# Patient Record
Sex: MALE | Race: WHITE | NOT HISPANIC OR LATINO | URBAN - METROPOLITAN AREA
[De-identification: names, ages, dates, MRNs, and addresses within clinical notes are randomized per-mention and may not be internally consistent; named-entity substitution may affect disease eponyms.]

---

## 2019-04-19 ENCOUNTER — OUTPATIENT (OUTPATIENT)
Dept: OUTPATIENT SERVICES | Facility: HOSPITAL | Age: 69
LOS: 1 days | Discharge: HOME | End: 2019-04-19
Payer: COMMERCIAL

## 2019-04-19 DIAGNOSIS — R06.2 WHEEZING: ICD-10-CM

## 2019-04-19 PROCEDURE — 78452 HT MUSCLE IMAGE SPECT MULT: CPT | Mod: 26

## 2020-01-01 ENCOUNTER — OUTPATIENT (OUTPATIENT)
Dept: OUTPATIENT SERVICES | Facility: HOSPITAL | Age: 70
LOS: 1 days | Discharge: HOME | End: 2020-01-01
Payer: MEDICARE

## 2020-01-01 ENCOUNTER — APPOINTMENT (OUTPATIENT)
Dept: NEUROLOGY | Facility: CLINIC | Age: 70
End: 2020-01-01

## 2020-01-01 ENCOUNTER — TRANSCRIPTION ENCOUNTER (OUTPATIENT)
Age: 70
End: 2020-01-01

## 2020-01-01 ENCOUNTER — INPATIENT (INPATIENT)
Facility: HOSPITAL | Age: 70
LOS: 5 days | Discharge: HOME | End: 2020-03-23
Attending: INTERNAL MEDICINE | Admitting: INTERNAL MEDICINE
Payer: MEDICARE

## 2020-01-01 ENCOUNTER — APPOINTMENT (OUTPATIENT)
Dept: HEMATOLOGY ONCOLOGY | Facility: CLINIC | Age: 70
End: 2020-01-01

## 2020-01-01 VITALS
OXYGEN SATURATION: 98 % | RESPIRATION RATE: 20 BRPM | HEART RATE: 89 BPM | SYSTOLIC BLOOD PRESSURE: 153 MMHG | DIASTOLIC BLOOD PRESSURE: 90 MMHG | TEMPERATURE: 98 F

## 2020-01-01 VITALS
TEMPERATURE: 98 F | SYSTOLIC BLOOD PRESSURE: 125 MMHG | RESPIRATION RATE: 20 BRPM | DIASTOLIC BLOOD PRESSURE: 79 MMHG | HEART RATE: 87 BPM

## 2020-01-01 DIAGNOSIS — G93.6 CEREBRAL EDEMA: ICD-10-CM

## 2020-01-01 DIAGNOSIS — C71.8 MALIGNANT NEOPLASM OF OVERLAPPING SITES OF BRAIN: ICD-10-CM

## 2020-01-01 DIAGNOSIS — E66.9 OBESITY, UNSPECIFIED: ICD-10-CM

## 2020-01-01 DIAGNOSIS — E78.5 HYPERLIPIDEMIA, UNSPECIFIED: ICD-10-CM

## 2020-01-01 DIAGNOSIS — G93.9 DISORDER OF BRAIN, UNSPECIFIED: ICD-10-CM

## 2020-01-01 DIAGNOSIS — I10 ESSENTIAL (PRIMARY) HYPERTENSION: ICD-10-CM

## 2020-01-01 LAB
ALBUMIN SERPL ELPH-MCNC: 3.8 G/DL — SIGNIFICANT CHANGE UP (ref 3.5–5.2)
ALBUMIN SERPL ELPH-MCNC: 4.2 G/DL — SIGNIFICANT CHANGE UP (ref 3.5–5.2)
ALBUMIN SERPL ELPH-MCNC: 4.3 G/DL — SIGNIFICANT CHANGE UP (ref 3.5–5.2)
ALP SERPL-CCNC: 78 U/L — SIGNIFICANT CHANGE UP (ref 30–115)
ALP SERPL-CCNC: 79 U/L — SIGNIFICANT CHANGE UP (ref 30–115)
ALP SERPL-CCNC: 81 U/L — SIGNIFICANT CHANGE UP (ref 30–115)
ALP SERPL-CCNC: 88 U/L — SIGNIFICANT CHANGE UP (ref 30–115)
ALP SERPL-CCNC: 93 U/L — SIGNIFICANT CHANGE UP (ref 30–115)
ALP SERPL-CCNC: 95 U/L — SIGNIFICANT CHANGE UP (ref 30–115)
ALT FLD-CCNC: 15 U/L — SIGNIFICANT CHANGE UP (ref 0–41)
ALT FLD-CCNC: 17 U/L — SIGNIFICANT CHANGE UP (ref 0–41)
ALT FLD-CCNC: 18 U/L — SIGNIFICANT CHANGE UP (ref 0–41)
ALT FLD-CCNC: 25 U/L — SIGNIFICANT CHANGE UP (ref 0–41)
ALT FLD-CCNC: 29 U/L — SIGNIFICANT CHANGE UP (ref 0–41)
ALT FLD-CCNC: 29 U/L — SIGNIFICANT CHANGE UP (ref 0–41)
ANION GAP SERPL CALC-SCNC: 11 MMOL/L — SIGNIFICANT CHANGE UP (ref 7–14)
ANION GAP SERPL CALC-SCNC: 11 MMOL/L — SIGNIFICANT CHANGE UP (ref 7–14)
ANION GAP SERPL CALC-SCNC: 12 MMOL/L — SIGNIFICANT CHANGE UP (ref 7–14)
ANION GAP SERPL CALC-SCNC: 13 MMOL/L — SIGNIFICANT CHANGE UP (ref 7–14)
ANION GAP SERPL CALC-SCNC: 9 MMOL/L — SIGNIFICANT CHANGE UP (ref 7–14)
APTT BLD: 28 SEC — SIGNIFICANT CHANGE UP (ref 27–39.2)
AST SERPL-CCNC: 14 U/L — SIGNIFICANT CHANGE UP (ref 0–41)
AST SERPL-CCNC: 14 U/L — SIGNIFICANT CHANGE UP (ref 0–41)
AST SERPL-CCNC: 15 U/L — SIGNIFICANT CHANGE UP (ref 0–41)
AST SERPL-CCNC: 15 U/L — SIGNIFICANT CHANGE UP (ref 0–41)
AST SERPL-CCNC: 18 U/L — SIGNIFICANT CHANGE UP (ref 0–41)
AST SERPL-CCNC: 24 U/L — SIGNIFICANT CHANGE UP (ref 0–41)
BASOPHILS # BLD AUTO: 0.01 K/UL — SIGNIFICANT CHANGE UP (ref 0–0.2)
BASOPHILS # BLD AUTO: 0.02 K/UL — SIGNIFICANT CHANGE UP (ref 0–0.2)
BASOPHILS # BLD AUTO: 0.03 K/UL — SIGNIFICANT CHANGE UP (ref 0–0.2)
BASOPHILS # BLD AUTO: 0.04 K/UL — SIGNIFICANT CHANGE UP (ref 0–0.2)
BASOPHILS # BLD AUTO: 0.05 K/UL — SIGNIFICANT CHANGE UP (ref 0–0.2)
BASOPHILS NFR BLD AUTO: 0.1 % — SIGNIFICANT CHANGE UP (ref 0–1)
BASOPHILS NFR BLD AUTO: 0.2 % — SIGNIFICANT CHANGE UP (ref 0–1)
BASOPHILS NFR BLD AUTO: 0.3 % — SIGNIFICANT CHANGE UP (ref 0–1)
BASOPHILS NFR BLD AUTO: 0.4 % — SIGNIFICANT CHANGE UP (ref 0–1)
BASOPHILS NFR BLD AUTO: 0.5 % — SIGNIFICANT CHANGE UP (ref 0–1)
BILIRUB SERPL-MCNC: 0.4 MG/DL — SIGNIFICANT CHANGE UP (ref 0.2–1.2)
BLD GP AB SCN SERPL QL: SIGNIFICANT CHANGE UP
BLD GP AB SCN SERPL QL: SIGNIFICANT CHANGE UP
BUN SERPL-MCNC: 19 MG/DL — SIGNIFICANT CHANGE UP (ref 10–20)
BUN SERPL-MCNC: 19 MG/DL — SIGNIFICANT CHANGE UP (ref 10–20)
BUN SERPL-MCNC: 26 MG/DL — HIGH (ref 10–20)
BUN SERPL-MCNC: 27 MG/DL — HIGH (ref 10–20)
BUN SERPL-MCNC: 29 MG/DL — HIGH (ref 10–20)
BUN SERPL-MCNC: 31 MG/DL — HIGH (ref 10–20)
BUN SERPL-MCNC: 31 MG/DL — HIGH (ref 10–20)
CALCIUM SERPL-MCNC: 8.9 MG/DL — SIGNIFICANT CHANGE UP (ref 8.5–10.1)
CALCIUM SERPL-MCNC: 9 MG/DL — SIGNIFICANT CHANGE UP (ref 8.5–10.1)
CALCIUM SERPL-MCNC: 9 MG/DL — SIGNIFICANT CHANGE UP (ref 8.5–10.1)
CALCIUM SERPL-MCNC: 9.1 MG/DL — SIGNIFICANT CHANGE UP (ref 8.5–10.1)
CALCIUM SERPL-MCNC: 9.2 MG/DL — SIGNIFICANT CHANGE UP (ref 8.5–10.1)
CALCIUM SERPL-MCNC: 9.4 MG/DL — SIGNIFICANT CHANGE UP (ref 8.5–10.1)
CALCIUM SERPL-MCNC: 9.4 MG/DL — SIGNIFICANT CHANGE UP (ref 8.5–10.1)
CANCER AG19-9 SERPL-ACNC: 11 U/ML — SIGNIFICANT CHANGE UP
CEA SERPL-MCNC: 1.1 NG/ML — SIGNIFICANT CHANGE UP (ref 0–3.8)
CHLORIDE SERPL-SCNC: 101 MMOL/L — SIGNIFICANT CHANGE UP (ref 98–110)
CHLORIDE SERPL-SCNC: 102 MMOL/L — SIGNIFICANT CHANGE UP (ref 98–110)
CHLORIDE SERPL-SCNC: 105 MMOL/L — SIGNIFICANT CHANGE UP (ref 98–110)
CHLORIDE SERPL-SCNC: 98 MMOL/L — SIGNIFICANT CHANGE UP (ref 98–110)
CHLORIDE SERPL-SCNC: 98 MMOL/L — SIGNIFICANT CHANGE UP (ref 98–110)
CO2 SERPL-SCNC: 25 MMOL/L — SIGNIFICANT CHANGE UP (ref 17–32)
CO2 SERPL-SCNC: 26 MMOL/L — SIGNIFICANT CHANGE UP (ref 17–32)
CO2 SERPL-SCNC: 27 MMOL/L — SIGNIFICANT CHANGE UP (ref 17–32)
CO2 SERPL-SCNC: 27 MMOL/L — SIGNIFICANT CHANGE UP (ref 17–32)
CREAT SERPL-MCNC: 0.8 MG/DL — SIGNIFICANT CHANGE UP (ref 0.7–1.5)
CREAT SERPL-MCNC: 0.8 MG/DL — SIGNIFICANT CHANGE UP (ref 0.7–1.5)
CREAT SERPL-MCNC: 0.9 MG/DL — SIGNIFICANT CHANGE UP (ref 0.7–1.5)
CREAT SERPL-MCNC: 1 MG/DL — SIGNIFICANT CHANGE UP (ref 0.7–1.5)
EOSINOPHIL # BLD AUTO: 0 K/UL — SIGNIFICANT CHANGE UP (ref 0–0.7)
EOSINOPHIL # BLD AUTO: 0.04 K/UL — SIGNIFICANT CHANGE UP (ref 0–0.7)
EOSINOPHIL # BLD AUTO: 0.18 K/UL — SIGNIFICANT CHANGE UP (ref 0–0.7)
EOSINOPHIL NFR BLD AUTO: 0 % — SIGNIFICANT CHANGE UP (ref 0–8)
EOSINOPHIL NFR BLD AUTO: 0.4 % — SIGNIFICANT CHANGE UP (ref 0–8)
EOSINOPHIL NFR BLD AUTO: 2.3 % — SIGNIFICANT CHANGE UP (ref 0–8)
GLUCOSE SERPL-MCNC: 118 MG/DL — HIGH (ref 70–99)
GLUCOSE SERPL-MCNC: 133 MG/DL — HIGH (ref 70–99)
GLUCOSE SERPL-MCNC: 134 MG/DL — HIGH (ref 70–99)
GLUCOSE SERPL-MCNC: 135 MG/DL — HIGH (ref 70–99)
GLUCOSE SERPL-MCNC: 136 MG/DL — HIGH (ref 70–99)
GLUCOSE SERPL-MCNC: 140 MG/DL — HIGH (ref 70–99)
GLUCOSE SERPL-MCNC: 99 MG/DL — SIGNIFICANT CHANGE UP (ref 70–99)
HCT VFR BLD CALC: 42.3 % — SIGNIFICANT CHANGE UP (ref 42–52)
HCT VFR BLD CALC: 43.1 % — SIGNIFICANT CHANGE UP (ref 42–52)
HCT VFR BLD CALC: 43.3 % — SIGNIFICANT CHANGE UP (ref 42–52)
HCT VFR BLD CALC: 43.6 % — SIGNIFICANT CHANGE UP (ref 42–52)
HCT VFR BLD CALC: 44 % — SIGNIFICANT CHANGE UP (ref 42–52)
HCT VFR BLD CALC: 44.1 % — SIGNIFICANT CHANGE UP (ref 42–52)
HCT VFR BLD CALC: 44.4 % — SIGNIFICANT CHANGE UP (ref 42–52)
HCV AB S/CO SERPL IA: 0.09 S/CO — SIGNIFICANT CHANGE UP (ref 0–0.99)
HCV AB SERPL-IMP: SIGNIFICANT CHANGE UP
HGB BLD-MCNC: 14 G/DL — SIGNIFICANT CHANGE UP (ref 14–18)
HGB BLD-MCNC: 14.2 G/DL — SIGNIFICANT CHANGE UP (ref 14–18)
HGB BLD-MCNC: 14.2 G/DL — SIGNIFICANT CHANGE UP (ref 14–18)
HGB BLD-MCNC: 14.3 G/DL — SIGNIFICANT CHANGE UP (ref 14–18)
HGB BLD-MCNC: 14.4 G/DL — SIGNIFICANT CHANGE UP (ref 14–18)
HGB BLD-MCNC: 14.6 G/DL — SIGNIFICANT CHANGE UP (ref 14–18)
HGB BLD-MCNC: 14.8 G/DL — SIGNIFICANT CHANGE UP (ref 14–18)
IMM GRANULOCYTES NFR BLD AUTO: 0.4 % — HIGH (ref 0.1–0.3)
IMM GRANULOCYTES NFR BLD AUTO: 0.5 % — HIGH (ref 0.1–0.3)
IMM GRANULOCYTES NFR BLD AUTO: 0.5 % — HIGH (ref 0.1–0.3)
IMM GRANULOCYTES NFR BLD AUTO: 0.6 % — HIGH (ref 0.1–0.3)
IMM GRANULOCYTES NFR BLD AUTO: 0.9 % — HIGH (ref 0.1–0.3)
IMM GRANULOCYTES NFR BLD AUTO: 1.3 % — HIGH (ref 0.1–0.3)
IMM GRANULOCYTES NFR BLD AUTO: 2.4 % — HIGH (ref 0.1–0.3)
INR BLD: 1.12 RATIO — SIGNIFICANT CHANGE UP (ref 0.65–1.3)
LYMPHOCYTES # BLD AUTO: 0.7 K/UL — LOW (ref 1.2–3.4)
LYMPHOCYTES # BLD AUTO: 0.99 K/UL — LOW (ref 1.2–3.4)
LYMPHOCYTES # BLD AUTO: 1.12 K/UL — LOW (ref 1.2–3.4)
LYMPHOCYTES # BLD AUTO: 1.2 K/UL — SIGNIFICANT CHANGE UP (ref 1.2–3.4)
LYMPHOCYTES # BLD AUTO: 1.22 K/UL — SIGNIFICANT CHANGE UP (ref 1.2–3.4)
LYMPHOCYTES # BLD AUTO: 1.36 K/UL — SIGNIFICANT CHANGE UP (ref 1.2–3.4)
LYMPHOCYTES # BLD AUTO: 1.44 K/UL — SIGNIFICANT CHANGE UP (ref 1.2–3.4)
LYMPHOCYTES # BLD AUTO: 10.8 % — LOW (ref 20.5–51.1)
LYMPHOCYTES # BLD AUTO: 10.9 % — LOW (ref 20.5–51.1)
LYMPHOCYTES # BLD AUTO: 11.2 % — LOW (ref 20.5–51.1)
LYMPHOCYTES # BLD AUTO: 11.9 % — LOW (ref 20.5–51.1)
LYMPHOCYTES # BLD AUTO: 12.4 % — LOW (ref 20.5–51.1)
LYMPHOCYTES # BLD AUTO: 15.7 % — LOW (ref 20.5–51.1)
LYMPHOCYTES # BLD AUTO: 9.9 % — LOW (ref 20.5–51.1)
MAGNESIUM SERPL-MCNC: 2 MG/DL — SIGNIFICANT CHANGE UP (ref 1.8–2.4)
MAGNESIUM SERPL-MCNC: 2.1 MG/DL — SIGNIFICANT CHANGE UP (ref 1.8–2.4)
MCHC RBC-ENTMCNC: 27.5 PG — SIGNIFICANT CHANGE UP (ref 27–31)
MCHC RBC-ENTMCNC: 27.6 PG — SIGNIFICANT CHANGE UP (ref 27–31)
MCHC RBC-ENTMCNC: 27.6 PG — SIGNIFICANT CHANGE UP (ref 27–31)
MCHC RBC-ENTMCNC: 28.4 PG — SIGNIFICANT CHANGE UP (ref 27–31)
MCHC RBC-ENTMCNC: 28.7 PG — SIGNIFICANT CHANGE UP (ref 27–31)
MCHC RBC-ENTMCNC: 28.7 PG — SIGNIFICANT CHANGE UP (ref 27–31)
MCHC RBC-ENTMCNC: 29.2 PG — SIGNIFICANT CHANGE UP (ref 27–31)
MCHC RBC-ENTMCNC: 32.2 G/DL — SIGNIFICANT CHANGE UP (ref 32–37)
MCHC RBC-ENTMCNC: 32.3 G/DL — SIGNIFICANT CHANGE UP (ref 32–37)
MCHC RBC-ENTMCNC: 32.4 G/DL — SIGNIFICANT CHANGE UP (ref 32–37)
MCHC RBC-ENTMCNC: 33.1 G/DL — SIGNIFICANT CHANGE UP (ref 32–37)
MCHC RBC-ENTMCNC: 33.2 G/DL — SIGNIFICANT CHANGE UP (ref 32–37)
MCHC RBC-ENTMCNC: 33.7 G/DL — SIGNIFICANT CHANGE UP (ref 32–37)
MCHC RBC-ENTMCNC: 33.9 G/DL — SIGNIFICANT CHANGE UP (ref 32–37)
MCV RBC AUTO: 84.7 FL — SIGNIFICANT CHANGE UP (ref 80–94)
MCV RBC AUTO: 85.1 FL — SIGNIFICANT CHANGE UP (ref 80–94)
MCV RBC AUTO: 85.1 FL — SIGNIFICANT CHANGE UP (ref 80–94)
MCV RBC AUTO: 85.5 FL — SIGNIFICANT CHANGE UP (ref 80–94)
MCV RBC AUTO: 85.6 FL — SIGNIFICANT CHANGE UP (ref 80–94)
MCV RBC AUTO: 86.6 FL — SIGNIFICANT CHANGE UP (ref 80–94)
MCV RBC AUTO: 86.7 FL — SIGNIFICANT CHANGE UP (ref 80–94)
MONOCYTES # BLD AUTO: 0.15 K/UL — SIGNIFICANT CHANGE UP (ref 0.1–0.6)
MONOCYTES # BLD AUTO: 0.42 K/UL — SIGNIFICANT CHANGE UP (ref 0.1–0.6)
MONOCYTES # BLD AUTO: 0.55 K/UL — SIGNIFICANT CHANGE UP (ref 0.1–0.6)
MONOCYTES # BLD AUTO: 0.58 K/UL — SIGNIFICANT CHANGE UP (ref 0.1–0.6)
MONOCYTES # BLD AUTO: 0.61 K/UL — HIGH (ref 0.1–0.6)
MONOCYTES # BLD AUTO: 0.63 K/UL — HIGH (ref 0.1–0.6)
MONOCYTES # BLD AUTO: 0.78 K/UL — HIGH (ref 0.1–0.6)
MONOCYTES NFR BLD AUTO: 2.1 % — SIGNIFICANT CHANGE UP (ref 1.7–9.3)
MONOCYTES NFR BLD AUTO: 4.7 % — SIGNIFICANT CHANGE UP (ref 1.7–9.3)
MONOCYTES NFR BLD AUTO: 5.6 % — SIGNIFICANT CHANGE UP (ref 1.7–9.3)
MONOCYTES NFR BLD AUTO: 5.8 % — SIGNIFICANT CHANGE UP (ref 1.7–9.3)
MONOCYTES NFR BLD AUTO: 5.9 % — SIGNIFICANT CHANGE UP (ref 1.7–9.3)
MONOCYTES NFR BLD AUTO: 6 % — SIGNIFICANT CHANGE UP (ref 1.7–9.3)
MONOCYTES NFR BLD AUTO: 7.1 % — SIGNIFICANT CHANGE UP (ref 1.7–9.3)
NEUTROPHILS # BLD AUTO: 10.63 K/UL — HIGH (ref 1.4–6.5)
NEUTROPHILS # BLD AUTO: 5.76 K/UL — SIGNIFICANT CHANGE UP (ref 1.4–6.5)
NEUTROPHILS # BLD AUTO: 6.17 K/UL — SIGNIFICANT CHANGE UP (ref 1.4–6.5)
NEUTROPHILS # BLD AUTO: 7.41 K/UL — HIGH (ref 1.4–6.5)
NEUTROPHILS # BLD AUTO: 8.19 K/UL — HIGH (ref 1.4–6.5)
NEUTROPHILS # BLD AUTO: 8.56 K/UL — HIGH (ref 1.4–6.5)
NEUTROPHILS # BLD AUTO: 8.77 K/UL — HIGH (ref 1.4–6.5)
NEUTROPHILS NFR BLD AUTO: 74 % — SIGNIFICANT CHANGE UP (ref 42.2–75.2)
NEUTROPHILS NFR BLD AUTO: 80.2 % — HIGH (ref 42.2–75.2)
NEUTROPHILS NFR BLD AUTO: 80.4 % — HIGH (ref 42.2–75.2)
NEUTROPHILS NFR BLD AUTO: 81.1 % — HIGH (ref 42.2–75.2)
NEUTROPHILS NFR BLD AUTO: 82.5 % — HIGH (ref 42.2–75.2)
NEUTROPHILS NFR BLD AUTO: 83.5 % — HIGH (ref 42.2–75.2)
NEUTROPHILS NFR BLD AUTO: 87.3 % — HIGH (ref 42.2–75.2)
NRBC # BLD: 0 /100 WBCS — SIGNIFICANT CHANGE UP (ref 0–0)
PLATELET # BLD AUTO: 208 K/UL — SIGNIFICANT CHANGE UP (ref 130–400)
PLATELET # BLD AUTO: 254 K/UL — SIGNIFICANT CHANGE UP (ref 130–400)
PLATELET # BLD AUTO: 266 K/UL — SIGNIFICANT CHANGE UP (ref 130–400)
PLATELET # BLD AUTO: 273 K/UL — SIGNIFICANT CHANGE UP (ref 130–400)
PLATELET # BLD AUTO: 273 K/UL — SIGNIFICANT CHANGE UP (ref 130–400)
PLATELET # BLD AUTO: 279 K/UL — SIGNIFICANT CHANGE UP (ref 130–400)
PLATELET # BLD AUTO: 285 K/UL — SIGNIFICANT CHANGE UP (ref 130–400)
POTASSIUM SERPL-MCNC: 4.8 MMOL/L — SIGNIFICANT CHANGE UP (ref 3.5–5)
POTASSIUM SERPL-MCNC: 4.9 MMOL/L — SIGNIFICANT CHANGE UP (ref 3.5–5)
POTASSIUM SERPL-MCNC: 5 MMOL/L — SIGNIFICANT CHANGE UP (ref 3.5–5)
POTASSIUM SERPL-MCNC: 5.1 MMOL/L — HIGH (ref 3.5–5)
POTASSIUM SERPL-MCNC: 6.3 MMOL/L — CRITICAL HIGH (ref 3.5–5)
POTASSIUM SERPL-SCNC: 4.8 MMOL/L — SIGNIFICANT CHANGE UP (ref 3.5–5)
POTASSIUM SERPL-SCNC: 4.9 MMOL/L — SIGNIFICANT CHANGE UP (ref 3.5–5)
POTASSIUM SERPL-SCNC: 5 MMOL/L — SIGNIFICANT CHANGE UP (ref 3.5–5)
POTASSIUM SERPL-SCNC: 5.1 MMOL/L — HIGH (ref 3.5–5)
POTASSIUM SERPL-SCNC: 6.3 MMOL/L — CRITICAL HIGH (ref 3.5–5)
PROT SERPL-MCNC: 6.4 G/DL — SIGNIFICANT CHANGE UP (ref 6–8)
PROT SERPL-MCNC: 6.4 G/DL — SIGNIFICANT CHANGE UP (ref 6–8)
PROT SERPL-MCNC: 6.6 G/DL — SIGNIFICANT CHANGE UP (ref 6–8)
PROT SERPL-MCNC: 6.7 G/DL — SIGNIFICANT CHANGE UP (ref 6–8)
PROT SERPL-MCNC: 6.8 G/DL — SIGNIFICANT CHANGE UP (ref 6–8)
PROT SERPL-MCNC: 6.9 G/DL — SIGNIFICANT CHANGE UP (ref 6–8)
PROTHROM AB SERPL-ACNC: 12.9 SEC — HIGH (ref 9.95–12.87)
PSA FLD-MCNC: 4.01 NG/ML — HIGH (ref 0–4)
RBC # BLD: 4.88 M/UL — SIGNIFICANT CHANGE UP (ref 4.7–6.1)
RBC # BLD: 5 M/UL — SIGNIFICANT CHANGE UP (ref 4.7–6.1)
RBC # BLD: 5.04 M/UL — SIGNIFICANT CHANGE UP (ref 4.7–6.1)
RBC # BLD: 5.15 M/UL — SIGNIFICANT CHANGE UP (ref 4.7–6.1)
RBC # BLD: 5.15 M/UL — SIGNIFICANT CHANGE UP (ref 4.7–6.1)
RBC # BLD: 5.17 M/UL — SIGNIFICANT CHANGE UP (ref 4.7–6.1)
RBC # BLD: 5.22 M/UL — SIGNIFICANT CHANGE UP (ref 4.7–6.1)
RBC # FLD: 13.5 % — SIGNIFICANT CHANGE UP (ref 11.5–14.5)
RBC # FLD: 13.6 % — SIGNIFICANT CHANGE UP (ref 11.5–14.5)
RBC # FLD: 13.7 % — SIGNIFICANT CHANGE UP (ref 11.5–14.5)
RBC # FLD: 13.8 % — SIGNIFICANT CHANGE UP (ref 11.5–14.5)
SODIUM SERPL-SCNC: 135 MMOL/L — SIGNIFICANT CHANGE UP (ref 135–146)
SODIUM SERPL-SCNC: 136 MMOL/L — SIGNIFICANT CHANGE UP (ref 135–146)
SODIUM SERPL-SCNC: 137 MMOL/L — SIGNIFICANT CHANGE UP (ref 135–146)
SODIUM SERPL-SCNC: 139 MMOL/L — SIGNIFICANT CHANGE UP (ref 135–146)
SODIUM SERPL-SCNC: 139 MMOL/L — SIGNIFICANT CHANGE UP (ref 135–146)
SODIUM SERPL-SCNC: 140 MMOL/L — SIGNIFICANT CHANGE UP (ref 135–146)
SODIUM SERPL-SCNC: 142 MMOL/L — SIGNIFICANT CHANGE UP (ref 135–146)
WBC # BLD: 10.1 K/UL — SIGNIFICANT CHANGE UP (ref 4.8–10.8)
WBC # BLD: 10.37 K/UL — SIGNIFICANT CHANGE UP (ref 4.8–10.8)
WBC # BLD: 10.93 K/UL — HIGH (ref 4.8–10.8)
WBC # BLD: 13.22 K/UL — HIGH (ref 4.8–10.8)
WBC # BLD: 7.07 K/UL — SIGNIFICANT CHANGE UP (ref 4.8–10.8)
WBC # BLD: 7.78 K/UL — SIGNIFICANT CHANGE UP (ref 4.8–10.8)
WBC # BLD: 8.87 K/UL — SIGNIFICANT CHANGE UP (ref 4.8–10.8)
WBC # FLD AUTO: 10.1 K/UL — SIGNIFICANT CHANGE UP (ref 4.8–10.8)
WBC # FLD AUTO: 10.37 K/UL — SIGNIFICANT CHANGE UP (ref 4.8–10.8)
WBC # FLD AUTO: 10.93 K/UL — HIGH (ref 4.8–10.8)
WBC # FLD AUTO: 13.22 K/UL — HIGH (ref 4.8–10.8)
WBC # FLD AUTO: 7.07 K/UL — SIGNIFICANT CHANGE UP (ref 4.8–10.8)
WBC # FLD AUTO: 7.78 K/UL — SIGNIFICANT CHANGE UP (ref 4.8–10.8)
WBC # FLD AUTO: 8.87 K/UL — SIGNIFICANT CHANGE UP (ref 4.8–10.8)

## 2020-01-01 PROCEDURE — 99231 SBSQ HOSP IP/OBS SF/LOW 25: CPT

## 2020-01-01 PROCEDURE — 99223 1ST HOSP IP/OBS HIGH 75: CPT

## 2020-01-01 PROCEDURE — 99285 EMERGENCY DEPT VISIT HI MDM: CPT

## 2020-01-01 PROCEDURE — 99232 SBSQ HOSP IP/OBS MODERATE 35: CPT

## 2020-01-01 PROCEDURE — 74177 CT ABD & PELVIS W/CONTRAST: CPT | Mod: 26

## 2020-01-01 PROCEDURE — 70552 MRI BRAIN STEM W/DYE: CPT | Mod: 26

## 2020-01-01 PROCEDURE — 70450 CT HEAD/BRAIN W/O DYE: CPT | Mod: 26

## 2020-01-01 PROCEDURE — 99233 SBSQ HOSP IP/OBS HIGH 50: CPT

## 2020-01-01 PROCEDURE — 71045 X-RAY EXAM CHEST 1 VIEW: CPT | Mod: 26

## 2020-01-01 PROCEDURE — 70553 MRI BRAIN STEM W/O & W/DYE: CPT | Mod: 26

## 2020-01-01 PROCEDURE — 99252 IP/OBS CONSLTJ NEW/EST SF 35: CPT

## 2020-01-01 PROCEDURE — 71260 CT THORAX DX C+: CPT | Mod: 26

## 2020-01-01 PROCEDURE — 93010 ELECTROCARDIOGRAM REPORT: CPT

## 2020-01-01 RX ORDER — LEVETIRACETAM 250 MG/1
500 TABLET, FILM COATED ORAL
Refills: 0 | Status: DISCONTINUED | OUTPATIENT
Start: 2020-01-01 | End: 2020-01-01

## 2020-01-01 RX ORDER — ATORVASTATIN CALCIUM 80 MG/1
40 TABLET, FILM COATED ORAL AT BEDTIME
Refills: 0 | Status: DISCONTINUED | OUTPATIENT
Start: 2020-01-01 | End: 2020-01-01

## 2020-01-01 RX ORDER — DEXAMETHASONE 0.5 MG/5ML
10 ELIXIR ORAL ONCE
Refills: 0 | Status: COMPLETED | OUTPATIENT
Start: 2020-01-01 | End: 2020-01-01

## 2020-01-01 RX ORDER — DEXAMETHASONE 0.5 MG/5ML
4 ELIXIR ORAL EVERY 6 HOURS
Refills: 0 | Status: DISCONTINUED | OUTPATIENT
Start: 2020-01-01 | End: 2020-01-01

## 2020-01-01 RX ORDER — DEXAMETHASONE 0.5 MG/5ML
10 ELIXIR ORAL ONCE
Refills: 0 | Status: DISCONTINUED | OUTPATIENT
Start: 2020-01-01 | End: 2020-01-01

## 2020-01-01 RX ORDER — LISINOPRIL 2.5 MG/1
1 TABLET ORAL
Qty: 0 | Refills: 0 | DISCHARGE

## 2020-01-01 RX ORDER — ENOXAPARIN SODIUM 100 MG/ML
40 INJECTION SUBCUTANEOUS DAILY
Refills: 0 | Status: DISCONTINUED | OUTPATIENT
Start: 2020-01-01 | End: 2020-01-01

## 2020-01-01 RX ORDER — IOHEXOL 300 MG/ML
30 INJECTION, SOLUTION INTRAVENOUS ONCE
Refills: 0 | Status: COMPLETED | OUTPATIENT
Start: 2020-01-01 | End: 2020-01-01

## 2020-01-01 RX ORDER — DEXAMETHASONE 0.5 MG/5ML
1 ELIXIR ORAL
Qty: 28 | Refills: 0
Start: 2020-01-01 | End: 2020-01-01

## 2020-01-01 RX ORDER — DEXAMETHASONE 0.5 MG/5ML
1 ELIXIR ORAL
Qty: 120 | Refills: 0
Start: 2020-01-01 | End: 2020-01-01

## 2020-01-01 RX ORDER — LEVETIRACETAM 250 MG/1
500 TABLET, FILM COATED ORAL ONCE
Refills: 0 | Status: COMPLETED | OUTPATIENT
Start: 2020-01-01 | End: 2020-01-01

## 2020-01-01 RX ORDER — ASPIRIN/CALCIUM CARB/MAGNESIUM 324 MG
1 TABLET ORAL
Qty: 0 | Refills: 0 | DISCHARGE

## 2020-01-01 RX ORDER — ROSUVASTATIN CALCIUM 5 MG/1
1 TABLET ORAL
Qty: 0 | Refills: 0 | DISCHARGE

## 2020-01-01 RX ORDER — LEVETIRACETAM 250 MG/1
1 TABLET, FILM COATED ORAL
Qty: 60 | Refills: 0
Start: 2020-01-01 | End: 2020-01-01

## 2020-01-01 RX ORDER — LISINOPRIL 2.5 MG/1
2.5 TABLET ORAL DAILY
Refills: 0 | Status: DISCONTINUED | OUTPATIENT
Start: 2020-01-01 | End: 2020-01-01

## 2020-01-01 RX ADMIN — Medication 4 MILLIGRAM(S): at 11:01

## 2020-01-01 RX ADMIN — Medication 102 MILLIGRAM(S): at 14:50

## 2020-01-01 RX ADMIN — Medication 4 MILLIGRAM(S): at 17:42

## 2020-01-01 RX ADMIN — LISINOPRIL 2.5 MILLIGRAM(S): 2.5 TABLET ORAL at 05:34

## 2020-01-01 RX ADMIN — Medication 4 MILLIGRAM(S): at 12:43

## 2020-01-01 RX ADMIN — Medication 4 MILLIGRAM(S): at 23:58

## 2020-01-01 RX ADMIN — Medication 4 MILLIGRAM(S): at 00:10

## 2020-01-01 RX ADMIN — Medication 4 MILLIGRAM(S): at 00:57

## 2020-01-01 RX ADMIN — LEVETIRACETAM 500 MILLIGRAM(S): 250 TABLET, FILM COATED ORAL at 17:22

## 2020-01-01 RX ADMIN — LEVETIRACETAM 500 MILLIGRAM(S): 250 TABLET, FILM COATED ORAL at 06:07

## 2020-01-01 RX ADMIN — Medication 4 MILLIGRAM(S): at 05:34

## 2020-01-01 RX ADMIN — Medication 4 MILLIGRAM(S): at 17:23

## 2020-01-01 RX ADMIN — LEVETIRACETAM 500 MILLIGRAM(S): 250 TABLET, FILM COATED ORAL at 17:23

## 2020-01-01 RX ADMIN — LEVETIRACETAM 420 MILLIGRAM(S): 250 TABLET, FILM COATED ORAL at 17:00

## 2020-01-01 RX ADMIN — LEVETIRACETAM 500 MILLIGRAM(S): 250 TABLET, FILM COATED ORAL at 05:03

## 2020-01-01 RX ADMIN — Medication 4 MILLIGRAM(S): at 01:00

## 2020-01-01 RX ADMIN — LEVETIRACETAM 500 MILLIGRAM(S): 250 TABLET, FILM COATED ORAL at 17:15

## 2020-01-01 RX ADMIN — Medication 4 MILLIGRAM(S): at 00:00

## 2020-01-01 RX ADMIN — ATORVASTATIN CALCIUM 40 MILLIGRAM(S): 80 TABLET, FILM COATED ORAL at 22:07

## 2020-01-01 RX ADMIN — Medication 4 MILLIGRAM(S): at 05:14

## 2020-01-01 RX ADMIN — LEVETIRACETAM 500 MILLIGRAM(S): 250 TABLET, FILM COATED ORAL at 17:42

## 2020-01-01 RX ADMIN — ATORVASTATIN CALCIUM 40 MILLIGRAM(S): 80 TABLET, FILM COATED ORAL at 21:16

## 2020-01-01 RX ADMIN — Medication 4 MILLIGRAM(S): at 23:00

## 2020-01-01 RX ADMIN — ATORVASTATIN CALCIUM 40 MILLIGRAM(S): 80 TABLET, FILM COATED ORAL at 21:12

## 2020-01-01 RX ADMIN — LEVETIRACETAM 500 MILLIGRAM(S): 250 TABLET, FILM COATED ORAL at 05:14

## 2020-01-01 RX ADMIN — ENOXAPARIN SODIUM 40 MILLIGRAM(S): 100 INJECTION SUBCUTANEOUS at 11:01

## 2020-01-01 RX ADMIN — Medication 4 MILLIGRAM(S): at 05:03

## 2020-01-01 RX ADMIN — Medication 4 MILLIGRAM(S): at 17:15

## 2020-01-01 RX ADMIN — ATORVASTATIN CALCIUM 40 MILLIGRAM(S): 80 TABLET, FILM COATED ORAL at 21:39

## 2020-01-01 RX ADMIN — LISINOPRIL 2.5 MILLIGRAM(S): 2.5 TABLET ORAL at 05:20

## 2020-01-01 RX ADMIN — LEVETIRACETAM 500 MILLIGRAM(S): 250 TABLET, FILM COATED ORAL at 05:20

## 2020-01-01 RX ADMIN — LISINOPRIL 2.5 MILLIGRAM(S): 2.5 TABLET ORAL at 06:07

## 2020-01-01 RX ADMIN — LISINOPRIL 2.5 MILLIGRAM(S): 2.5 TABLET ORAL at 05:03

## 2020-01-01 RX ADMIN — Medication 4 MILLIGRAM(S): at 06:08

## 2020-01-01 RX ADMIN — Medication 10 MILLIGRAM(S): at 17:43

## 2020-01-01 RX ADMIN — Medication 4 MILLIGRAM(S): at 11:08

## 2020-01-01 RX ADMIN — ENOXAPARIN SODIUM 40 MILLIGRAM(S): 100 INJECTION SUBCUTANEOUS at 11:08

## 2020-01-01 RX ADMIN — ATORVASTATIN CALCIUM 40 MILLIGRAM(S): 80 TABLET, FILM COATED ORAL at 20:57

## 2020-01-01 RX ADMIN — LISINOPRIL 2.5 MILLIGRAM(S): 2.5 TABLET ORAL at 06:29

## 2020-01-01 RX ADMIN — LEVETIRACETAM 500 MILLIGRAM(S): 250 TABLET, FILM COATED ORAL at 05:34

## 2020-01-01 RX ADMIN — Medication 4 MILLIGRAM(S): at 17:22

## 2020-01-01 RX ADMIN — Medication 4 MILLIGRAM(S): at 12:35

## 2020-01-01 RX ADMIN — IOHEXOL 30 MILLILITER(S): 300 INJECTION, SOLUTION INTRAVENOUS at 10:22

## 2020-01-01 RX ADMIN — Medication 4 MILLIGRAM(S): at 17:04

## 2020-01-01 RX ADMIN — ATORVASTATIN CALCIUM 40 MILLIGRAM(S): 80 TABLET, FILM COATED ORAL at 21:09

## 2020-01-01 RX ADMIN — LEVETIRACETAM 500 MILLIGRAM(S): 250 TABLET, FILM COATED ORAL at 06:29

## 2020-01-01 RX ADMIN — Medication 4 MILLIGRAM(S): at 06:29

## 2020-01-01 RX ADMIN — Medication 4 MILLIGRAM(S): at 11:53

## 2020-01-01 RX ADMIN — Medication 4 MILLIGRAM(S): at 05:20

## 2020-01-01 RX ADMIN — LEVETIRACETAM 500 MILLIGRAM(S): 250 TABLET, FILM COATED ORAL at 17:04

## 2020-01-01 RX ADMIN — LISINOPRIL 2.5 MILLIGRAM(S): 2.5 TABLET ORAL at 05:14

## 2020-03-17 NOTE — ED PROVIDER NOTE - CLINICAL SUMMARY MEDICAL DECISION MAKING FREE TEXT BOX
Pt with 2-3 months of confusion, memory loss, unsteady gait, recent MRI with 3 brain masses. head ct in ER with extensive edema, no bleeding.  pt given decadron, keppra, seen by neurosurg, admitted for continued w/u.

## 2020-03-17 NOTE — H&P ADULT - ATTENDING COMMENTS
Pt was seen and examined at bedside independently, pt is upset because family is not visiting due to hospital restrictions, he is very forgetful. He denies any complaints, no pain, no HA. On admission he was found to have brain lesions with midline shift ( likely metastatic), no know primary.  Today he is scheduled for CT chest, abdomen and pelvis, pt denies any h/o smoking, no h/o screening colonoscopy in the past ( pt is not reliable historian).  I agree with medical resident's findings , assessment and plan above with few corrections in my note.     Vital Signs Last 24 Hrs  T(C): 35.9 (18 Mar 2020 04:24), Max: 37 (17 Mar 2020 19:19)  T(F): 96.7 (18 Mar 2020 04:24), Max: 98.6 (17 Mar 2020 19:19)  HR: 80 (18 Mar 2020 04:24) (80 - 98)  BP: 131/79 (18 Mar 2020 04:24) (119/46 - 149/87)  BP(mean): --  RR: 20 (18 Mar 2020 04:24) (20 - 22)  SpO2: 98% (17 Mar 2020 17:51) (98% - 98%)    GENERAL: NAD, forgetful , obese   CHEST/LUNG: Clear to auscultation bilaterally, distant BS   HEART: Regular rate and rhythm  ABDOMEN: Bowel sounds present; Soft, Nontender, Nondistended, obese   EXTREMITIES: trace edema on LE   NERVOUS SYSTEM:  Alert & Oriented X3, forgetful, no focal neuro deficit     LABS:                         14.6   7.07  )-----------( 273      ( 18 Mar 2020 06:33 )             43.3   `03-18    142  |  102  |  19  ----------------------------<  140<H>  5.0   |  27  |  0.8    Ca    9.4      18 Mar 2020 06:33  Mg     2.1     03-18    TPro  6.6  /  Alb  3.8  /  TBili  0.4  /  DBili  x   /  AST  24  /  ALT  18  /  AlkPhos  95  03-17    RADIOLOGY:  < from: CT Abdomen and Pelvis w/ Oral Cont and w/ IV Cont (03.18.20 @ 13:10) >    IMPRESSION:     1. No evidence of intra-abdominal or pelvic metastasis.    2. Scattered sigmoid colonic diverticula, without evidence of diverticulitis.  < from: Xray Chest 1 View AP/PA (03.17.20 @ 16:41) >  Impression:  Mild interstitial opacities.  Heart appears enlarged, limited in evaluation due to AP portable technique  < from: CT Head No Cont (03.17.20 @ 14:54) >    IMPRESSION:    Extensive edema in the left cerebral hemisphere, splenium of the corpus callosum with extension into the right parietal lobe, this results in mass effect upon the adjacent left lateral ventricle and third ventricle. Underlying lesions were demonstrated on the prior MRI of the brain dated March 12, 2020, please see that report for further information.    A/P  # AMS / brain metastatic Dz   - pt denies h/o smoking, no h/o screening colonoscopy in the past ( pt is a poor historian)   - unknown primary ( CT abdomen is negative), f/u CT chest   - pt was consulted by neurosurgery, on Decadron and Keppra for seizure prophylaxis, no intervention recommended   - supportive care   - will consul medical and radiation oncology after work up completed ( pt needs tissue diagnosis)   - c/w current medical management and DVT prophylaxis   - get PT/rehab     #Progress Note Handoff  Pending (specify):  f/u CT chest , neurosurgery follow up, PT/Rehab   Family discussion: n/a, I spoke with pt, will contact family this afternoon   Disposition: Home___/SNF___/Other________/Unknown at this time___x_____

## 2020-03-17 NOTE — H&P ADULT - ASSESSMENT
69 year old male with PMhx of HTN and HLD presents to the ED for evaluation of confusion.    # Confusion and disorientation most likely secondary to multiple brain masses - suspicious of metastatic disease:   - patient is hemodynamically stable  - recent MRI with 3 masses with mass effect and midline shift  - seen by neurosurgery, no acute intervention  - started on decadron and keppra  - will get CT chest / abdomen and pelvis to look for possible primary tumor  - will need tissue diagnosis before getting oncology involved    # HTN: c/w lisinopril  # DLD: - c/w statin    dvt ppx: Lovenox  gi ppx: PPI  DASH diet   from home, uses cane, lives with wife and daughter 69 year old male with PMhx of HTN and HLD presents to the ED for evaluation of confusion.    # Confusion and disorientation most likely secondary to multiple brain masses - suspicious of multifocal GBM vs metastatic disease:   - patient is hemodynamically stable  - recent MRI with 3 masses with mass effect and midline shift  - seen by neurosurgery, no acute intervention  - started on decadron and keppra  - will get CT chest / abdomen and pelvis to rule out possible primary tumor  - consider hematology / oncology consult     # HTN: c/w lisinopril  # DLD: - c/w statin    dvt ppx: Lovenox  gi ppx: PPI  DASH diet   from home, uses cane, lives with wife and daughter

## 2020-03-17 NOTE — ED ADULT NURSE NOTE - INTERVENTIONS DEFINITIONS
Provide visual clues: red socks/Instruct patient to call for assistance/Stretcher in lowest position, wheels locked, appropriate side rails in place/Monitor gait and stability/Physically safe environment: no spills, clutter or unnecessary equipment

## 2020-03-17 NOTE — H&P ADULT - NSHPPHYSICALEXAM_GEN_ALL_CORE
GENERAL: NAD, lying in bed comfortably  CHEST/LUNG: Clear to auscultation bilaterally; No rales, rhonchi, wheezing, or rubs.   HEART: Regular rate and rhythm; No murmurs, rubs, or gallops  ABDOMEN: Bowel sounds present; Soft, Nontender, Nondistended.   EXTREMITIES:  2+ Peripheral Pulses, brisk capillary refill. No clubbing, cyanosis, or edema  NERVOUS SYSTEM:  Alert & Oriented X3, speech clear. No deficits

## 2020-03-17 NOTE — H&P ADULT - NSHPLABSRESULTS_GEN_ALL_CORE
Complete Blood Count + Automated Diff (03.17.20 @ 14:20)    WBC Count: 7.78 K/uL    RBC Count: 4.88 M/uL    Hemoglobin: 14.0 g/dL    Hematocrit: 42.3 %    Mean Cell Volume: 86.7 fL    Mean Cell Hemoglobin: 28.7 pg    Mean Cell Hemoglobin Conc: 33.1 g/dL    Red Cell Distrib Width: 13.8 %    Platelet Count - Automated: 254 K/uL    Auto Neutrophil #: 5.76 K/uL    Auto Lymphocyte #: 1.22 K/uL    Auto Monocyte #: 0.55 K/uL    Auto Eosinophil #: 0.18 K/uL    Auto Basophil #: 0.04 K/uL    Auto Neutrophil %: 74.0: Differential percentages must be correlated with absolute numbers for  clinical significance. %    Auto Lymphocyte %: 15.7 %    Auto Monocyte %: 7.1 %    Auto Eosinophil %: 2.3 %    Auto Basophil %: 0.5 %    Auto Immature Granulocyte %: 0.4 %    Nucleated RBC: 0 /100 WBCs    Comprehensive Metabolic Panel (03.17.20 @ 14:20)    Sodium, Serum: 139 mmol/L    Potassium, Serum: 6.3: Hemolyzed. Interpret with caution  Critical value: mmol/L    Chloride, Serum: 105 mmol/L    Carbon Dioxide, Serum: 25 mmol/L    Anion Gap, Serum: 9 mmol/L    Blood Urea Nitrogen, Serum: 19 mg/dL    Creatinine, Serum: 1.0 mg/dL    Glucose, Serum: 99 mg/dL    Calcium, Total Serum: 8.9 mg/dL    Protein Total, Serum: 6.6 g/dL    Albumin, Serum: 3.8 g/dL    Bilirubin Total, Serum: 0.4 mg/dL    Alkaline Phosphatase, Serum: 95: Hemolyzed. Interpret with caution U/L    Aspartate Aminotransferase (AST/SGOT): 24: Hemolyzed. Interpret with caution U/L    Alanine Aminotransferase (ALT/SGPT): 18: Hemolyzed. Interpret with caution U/L    eGFR if Non : 76: Interpretative comment  The units for eGFR are mL/min/1.73M2 (normalized body surface area). The  eGFR is calculated from a serum creatinine using the CKD-EPI equation.  Other variables required for calculation are race, age and sex. Among  patients with chronic kidney disease (CKD), the eGFR is useful in  determining the stage of disease according to KDOQI CKD classification.  All eGFR results are reported numerically with the following  interpretation.          GFR                    With                 Without     (ml/min/1.73 m2)    Kidney Damage       Kidney Damage        >= 90                    Stage 1                     Normal        60-89                    Stage 2                     Decreased GFR        30-59     Stage 3                     Stage 3        15-29                    Stage 4                     Stage 4        < 15                      Stage 5                     Stage 5  Each stage of CKD assumes that the associated GFR level has been in  effect for at least 3 months. Determination of stages one and two (with  eGFR > 59 ml/min/m2) requires estimation of kidney damage for at least 3  months as defined by structural or functional abnormalities.  Limitations: All estimates of GFR will be less accurate for patients at  extremes of muscle mass (including but not limited to frail elderly,  critically ill, or cancer patients), those with unusual diets, and those  with conditions associated with reduced secretion or extrarenal  elimination of creatinine. The eGFR equation is not recommended for use  in patients with unstable creatinine levels. mL/min/1.73M2    eGFR if : 88 mL/min/1.73M2      < from: MR Head w/wo IV Cont (03.12.20 @ 10:32) >    Impression:    1.  3 necrotic appearing enhancing brain masses    2.  Approximately 3 cm mass corpus callosum. 3.5 cm mass left temporal lobe, 3 cm mass left occipital lobe with surrounding edema and mass effect    3.  The differential diagnosis includes multifocal GBM, given the unilateral hemispheric location and corpus callosum. Can consider lymphoma (less likely) and metastatic disease    4.  About 5 mm left right midline shift    < end of copied text >

## 2020-03-17 NOTE — CONSULT NOTE ADULT - SUBJECTIVE AND OBJECTIVE BOX
HPI:  · HPI Objective Statement: 69 y.o male w/ hx of brain masses, HTN, HLD, obesity presents to the ED for evaluation of abnormal MRI. Per family over past 2 months more forgetful and confused.  Over past month complaining of intermittent headache and abnormal gait.  had MRI 3/12/20 which showed multiple masses in brain prompting visit to the ED.  Sent in by PMD for further eval.  Pt has no complaints at this time.  Poor historian.    as the patient is by himself at ED. Per daughter no recent falls or trauma. decreased appetite.  I am unable to obtain a comprehensive history, review of systems, past medical history, and/or physical exam due to constraints imposed by the urgency of the patient's clinical condition and/or mental status.  patient was seen and examined at the bedside in ER. the patient doesn't able to give a full history. he is intermittent follow order. he is not aware about the MRI findings which he did last week as outpatient. and he keeps c/o about his knee.      PAST MEDICAL & SURGICAL HISTORY:  Brain mass  HLD (hyperlipidemia)  HTN (hypertension)  No significant past surgical history      Home Medications:      Allergies    No Known Allergies    Intolerances        MEDICATIONS  (STANDING):  levETIRAcetam  IVPB 500 milliGRAM(s) IV Intermittent once    MEDICATIONS  (PRN):      ICU Vital Signs Last 24 Hrs  T(C): 36.8 (17 Mar 2020 13:09), Max: 36.8 (17 Mar 2020 13:09)  T(F): 98.2 (17 Mar 2020 13:09), Max: 98.2 (17 Mar 2020 13:09)  HR: 89 (17 Mar 2020 13:09) (89 - 89)  BP: 153/90 (17 Mar 2020 13:09) (153/90 - 153/90)  BP(mean): --  ABP: --  ABP(mean): --  RR: 20 (17 Mar 2020 13:09) (20 - 20)  SpO2: 98% (17 Mar 2020 13:09) (98% - 98%)      I&O's Detail      CBC Full  -  ( 17 Mar 2020 14:20 )  WBC Count : 7.78 K/uL  RBC Count : 4.88 M/uL  Hemoglobin : 14.0 g/dL  Hematocrit : 42.3 %  Platelet Count - Automated : 254 K/uL  Mean Cell Volume : 86.7 fL  Mean Cell Hemoglobin : 28.7 pg  Mean Cell Hemoglobin Concentration : 33.1 g/dL  Auto Neutrophil # : 5.76 K/uL  Auto Lymphocyte # : 1.22 K/uL  Auto Monocyte # : 0.55 K/uL  Auto Eosinophil # : 0.18 K/uL  Auto Basophil # : 0.04 K/uL  Auto Neutrophil % : 74.0 %  Auto Lymphocyte % : 15.7 %  Auto Monocyte % : 7.1 %  Auto Eosinophil % : 2.3 %  Auto Basophil % : 0.5 %    03-17    139  |  105  |  19  ----------------------------<  99  6.3<HH>   |  25  |  1.0    Ca    8.9      17 Mar 2020 14:20  Mg     2.0     03-17    TPro  6.6  /  Alb  3.8  /  TBili  0.4  /  DBili  x   /  AST  24  /  ALT  18  /  AlkPhos  95  03-17            AAOX2.  Verbal function intact. intermittent follow order  tongue midline.  he does not follow the order of smile or buff checks  PERRLA, EOMI  Pronator Drift is negative  Finger to Nose intact  Motor: MAEx4, 5/5 power in b/l UE  HF/HE 4/5, KF/KE/PF/DF 5/5  Sensation: intact to touch in all extremities    Imaging:< from: MR Head w/wo IV Cont (03.12.20 @ 10:32) >  Impression:    1.  3 necrotic appearing enhancing brain masses    2.  Approximately 3 cm mass corpus callosum. 3.5 cm mass left temporal lobe, 3 cm mass left occipital lobe with surrounding edema and mass effect    3.  The differential diagnosis includes multifocal GBM, given the unilateral hemispheric location and corpus callosum. Can consider lymphoma (less likely) and metastatic disease    4.  About 5 mm left right midline shift    < end of copied text >      Assessment/Plan  - no acute surgical intervention.  - Full metastatic work up	  - decadron  - keppra 500 q12h  - brain lab protocol  - d/w attg

## 2020-03-17 NOTE — ED PROVIDER NOTE - OBJECTIVE STATEMENT
69 y.o male w/ hx of brain masses, HTN, HLD, obesity presents to the ED for evaluation of abnormal MRI. Per family over past 2 months more forgetful and confused.  Over past month complaining of intermittent headache and abnormal gait.  had MRI 3/12/20 which showed multiple masses in brain prompting visit to the ED.  Sent in by PMD for further eval.  Pt has no complaints at this time.  Poor historian.  Per daughter no recent falls or trauma. decreased appetite.  I am unable to obtain a comprehensive history, review of systems, past medical history, and/or physical exam due to constraints imposed by the urgency of the patient's clinical condition and/or mental status.

## 2020-03-17 NOTE — ED PROVIDER NOTE - PHYSICAL EXAMINATION
CONST: Well appearing in NAD  EYES: Sclera and conjunctiva clear.  CARD: Normal S1 S2; Normal rate and rhythm  RESP: Equal BS B/L, No wheezes, rhonchi or rales. No distress  GI: Soft, non-tender, non-distended.  MS: Normal ROM in all extremities. No edema of lower extremities, no calf pain, radial pulses 2+ bilaterally  SKIN: Warm, dry, no acute rashes. Good turgor  NEURO: A&Ox2, difficulty following commands, CN II-XII intact, no facial asymmetry, + difficulty following commands, + dysmetria, no pronator drift,  gross sensation intact, UE/LE strength 5/5

## 2020-03-17 NOTE — ED PROVIDER NOTE - ATTENDING CONTRIBUTION TO CARE
68 y/o male with h/o htn, hld, in ER for eval of brain masses.  Pt has been having memory loss, unsteady gait, and ambulation issues for the past few months.  Went to see neuro as outpt, had noncon MRI 3/9/20 and then MRI with contrast 3/12/20 which showed 3 brain masses with surrounding edema, midline shift.  ? seizure, per note from ACP. pt poor historian, unsure.  Pt denies HA, dizziness, loc.  denies recent falls.  no cp/sob.  no abd pain.  c/o b/l knee pain and his legs giving out.    PE - nad, nc/at, eomi, perrl, op - clear, mmm, no c-spine tenderness, cta b/l, no w/r/r, rrr, abd- soft, nt/nd, nabs, from x 4, Alert, oriented x 2, to person/place, + clear speech, cn 2-12 intact, motor 5/5 b/l UE and LE, no sensory deficits, FTN with b/l uncoordination, gait not tested.  -check labs, head ct, will need admission

## 2020-03-17 NOTE — ED PROVIDER NOTE - PROGRESS NOTE DETAILS
discussed case w/ neurosurgery aware of pt Discussed case w/ neurosurgery.  recommends decadron, Keppra 500 q 12, brain lab protocol, metastatic work up.

## 2020-03-18 NOTE — PHYSICAL THERAPY INITIAL EVALUATION ADULT - PERTINENT HX OF CURRENT PROBLEM, REHAB EVAL
69 year old male with PMhx of HTN and HLD presents to the ED for evaluation of confusion. As per wife, patient c/o intermittent headache and has been more forgetful and confused over the past 2 mos. Had MRI as out patient 3/12/20 which showed multiple masses in brain prompting visit to the ED per PMD for further eval.

## 2020-03-18 NOTE — PHYSICAL THERAPY INITIAL EVALUATION ADULT - IMPAIRMENTS FOUND, PT EVAL
gait, locomotion, and balance/muscle strength/poor safety awareness/aerobic capacity/endurance/ergonomics and body mechanics

## 2020-03-18 NOTE — PROGRESS NOTE ADULT - SUBJECTIVE AND OBJECTIVE BOX
SUBJECTIVE:    Patient is a 69y old Male who presents with a chief complaint of   Currently admitted to medicine with the primary diagnosis of Brain mass     Today is hospital day 1d. This morning he is resting comfortably in bed and reports no new issues or overnight events.     PAST MEDICAL & SURGICAL HISTORY  HLD (hyperlipidemia)  HTN (hypertension)  No significant past surgical history    SOCIAL HISTORY:  Negative for smoking/alcohol/drug use.     ALLERGIES:  No Known Allergies    MEDICATIONS:  STANDING MEDICATIONS  atorvastatin 40 milliGRAM(s) Oral at bedtime  dexAMETHasone  Injectable 4 milliGRAM(s) IV Push every 6 hours  enoxaparin Injectable 40 milliGRAM(s) SubCutaneous daily  levETIRAcetam 500 milliGRAM(s) Oral two times a day  lisinopril 2.5 milliGRAM(s) Oral daily    PRN MEDICATIONS    VITALS:   T(F): 96.7  HR: 80  BP: 131/79  RR: 20  SpO2: 98%    LABS:                        14.0   7.78  )-----------( 254      ( 17 Mar 2020 14:20 )             42.3     03-17    139  |  105  |  19  ----------------------------<  99  6.3<HH>   |  25  |  1.0    Ca    8.9      17 Mar 2020 14:20  Mg     2.0     03-17    TPro  6.6  /  Alb  3.8  /  TBili  0.4  /  DBili  x   /  AST  24  /  ALT  18  /  AlkPhos  95  03-17                  RADIOLOGY:  < from: CT Head No Cont (03.17.20 @ 14:54) >  IMPRESSION:    Extensive edema in the left cerebral hemisphere, splenium of the corpus callosum with extension into the right parietal lobe, this results in mass effect upon the adjacent left lateral ventricle and third ventricle. Underlying lesions were demonstrated on the prior MRI of the brain dated March 12, 2020, please see that report for further information.    < end of copied text >  < from: MR Head w/wo IV Cont (03.12.20 @ 10:32) >    Impression:    1.  3 necrotic appearing enhancing brain masses    2.  Approximately 3 cm mass corpus callosum. 3.5 cm mass left temporal lobe, 3 cm mass left occipital lobe with surrounding edema and mass effect    3.  The differential diagnosis includes multifocal GBM, given the unilateral hemispheric location and corpus callosum. Can consider lymphoma (less likely) and metastatic disease    4.  About 5 mm left right midline shift      < end of copied text >  < from: NM Nuclear Stress Pharmacologic Multiple (04.19.19 @ 14:30) >  Impression:  1. IV Adenosine Dual Isotope Study which was negative with respect to   symptoms and EKG changes.  2. Myocardial perfusion imaging reveals no fixed no reperfusion defects  3. Gated imaging reveals normal wall motion thickening and ejection   fraction  Recommendation:  Medicaltherapy.    Risk factor modification    < end of copied text >      PHYSICAL EXAM:  GENERAL: NAD, lying in bed comfortably  CHEST/LUNG: Clear to auscultation bilaterally; No rales, rhonchi, wheezing, or rubs.   HEART: Regular rate and rhythm; No murmurs, rubs, or gallops  ABDOMEN: Bowel sounds present; Soft, Nontender, Nondistended.   EXTREMITIES:  2+ Peripheral Pulses, brisk capillary refill. No clubbing, cyanosis, or edema  NERVOUS SYSTEM:  Alert & Oriented X3, speech clear. No deficits

## 2020-03-18 NOTE — PHYSICAL THERAPY INITIAL EVALUATION ADULT - GENERAL OBSERVATIONS, REHAB EVAL
PT IE completed. Patient encountered supine in bed, in NAD, +IV lock. agreeable to therapy. No c/o pain noted, however patient gets out of breath after ambulating 50 ft. Has his cane in the room. Advised him not to use for ambulation for safety however can be used for transfers.

## 2020-03-18 NOTE — PROGRESS NOTE ADULT - ASSESSMENT
69 year old male with PMhx of HTN and HLD presents to the ED for evaluation of confusion.    # Confusion and disorientation most likely secondary to multiple brain masses - suspicious of multifocal GBM vs metastatic disease:  - patient is hemodynamically stable  - recent MRI 03/12/20 with 3 necrotic appearing brain masses with mass effect, surrounding edema and midline shift  - CT 03/17/20 extensive edema in left cerebral hemisphere, splenium of corpus collasum, extension into right parietal lobe, similar to previous MRI  - seen by neurosurgery, no acute intervention  - started on decadron and keppra  - will get CT chest / abdomen and pelvis to rule out possible primary tumor  - consider hematology / oncology consult     # HTN: c/w lisinopril  # DLD: - c/w statin    # Diet: DASH/TLC  # Dvt ppx: Lovenox  # Gi ppx: PPI  # Activity: AAT  # Dispo: home, uses cane, lives with wife and daughter 69 year old male with PMhx of HTN and HLD presents to the ED for evaluation of confusion.    # Confusion and disorientation most likely secondary to multiple brain masses - suspicious of multifocal GBM vs metastatic disease:  - patient is hemodynamically stable  - recent MRI 03/12/20 with 3 necrotic appearing brain masses with mass effect, surrounding edema and midline shift  - CT 03/17/20 extensive edema in left cerebral hemisphere, splenium of corpus collasum, extension into right parietal lobe, similar to previous MRI  - seen by neurosurgery, no acute intervention  - started on decadron and keppra  - will get CT chest / abdomen and pelvis to rule out possible primary tumor  - will consult hematology / oncology once primary tumor found    # HTN: c/w lisinopril  # DLD: - c/w statin    # Diet: DASH/TLC  # Dvt ppx: Lovenox  # Gi ppx: PPI  # Activity: AAT  # Dispo: home, uses cane, lives with wife and daughter  # Fullcode

## 2020-03-19 NOTE — PROGRESS NOTE ADULT - SUBJECTIVE AND OBJECTIVE BOX
SUBJECTIVE:    Patient is a 69y old Male who presents with a chief complaint of   Currently admitted to medicine with the primary diagnosis of Brain mass     Today is hospital day 2d. This morning he is resting comfortably in bed and reports no new issues or overnight events.     PAST MEDICAL & SURGICAL HISTORY  HLD (hyperlipidemia)  HTN (hypertension)  No significant past surgical history    SOCIAL HISTORY:  Negative for smoking/alcohol/drug use.     ALLERGIES:  No Known Allergies    MEDICATIONS:  STANDING MEDICATIONS  atorvastatin 40 milliGRAM(s) Oral at bedtime  dexAMETHasone     Tablet 4 milliGRAM(s) Oral every 6 hours  enoxaparin Injectable 40 milliGRAM(s) SubCutaneous daily  levETIRAcetam 500 milliGRAM(s) Oral two times a day  lisinopril 2.5 milliGRAM(s) Oral daily    PRN MEDICATIONS    VITALS:   T(F): 96.4  HR: 71  BP: 116/67  RR: 20  SpO2: 96%    LABS:                        14.2   10.10 )-----------( 208      ( 19 Mar 2020 06:44 )             44.1     03-19    140  |  101  |  26<H>  ----------------------------<  118<H>  4.9   |  26  |  0.9    Ca    9.4      19 Mar 2020 06:44  Mg     2.1     03-18    TPro  6.9  /  Alb  4.2  /  TBili  0.4  /  DBili  x   /  AST  15  /  ALT  15  /  AlkPhos  93  03-19                  RADIOLOGY:  < from: CT Abdomen and Pelvis w/ Oral Cont and w/ IV Cont (03.18.20 @ 13:10) >  IMPRESSION:     1. No evidence of intra-abdominal or pelvic metastasis.    2. Scattered sigmoid colonic diverticula, without evidence of diverticulitis.    < end of copied text >  < from: CT Chest w/ IV Cont (03.18.20 @ 13:10) >  IMPRESSION:    Lucent lesion in the manubrium which is suspicious in the setting of known malignancy. Nuclear medicine bone scan may be obtained for further evaluation.     Please see separate report for concurrent evaluation of the abdomen and pelvis.    < end of copied text >  < from: Xray Chest 1 View AP/PA (03.17.20 @ 16:41) >  Impression:    Mild interstitial opacities.    Heart appears enlarged, limited in evaluation due to AP portable technique    < end of copied text >  < from: CT Head No Cont (03.17.20 @ 14:54) >  IMPRESSION:    Extensive edema in the left cerebral hemisphere, splenium of the corpus callosum with extension into the right parietal lobe, this results in mass effect upon the adjacent left lateral ventricle and third ventricle. Underlying lesions were demonstrated on the prior MRI of the brain dated March 12, 2020, please see that report for further information.    < end of copied text >  < from: MR Head w/wo IV Cont (03.12.20 @ 10:32) >  Impression:    1.  3 necrotic appearing enhancing brain masses    2.  Approximately 3 cm mass corpus callosum. 3.5 cm mass left temporal lobe, 3 cm mass left occipital lobe with surrounding edema and mass effect    3.  The differential diagnosis includes multifocal GBM, given the unilateral hemispheric location and corpus callosum. Can consider lymphoma (less likely) and metastatic disease    4.  About 5 mm left right midline shift    < end of copied text >    PHYSICAL EXAM:  GENERAL: NAD, lying in bed comfortably  CHEST/LUNG: Clear to auscultation bilaterally; No rales, rhonchi, wheezing, or rubs.   HEART: Regular rate and rhythm; No murmurs, rubs, or gallops  ABDOMEN: Bowel sounds present; Soft, Nontender, Nondistended.   EXTREMITIES:  2+ Peripheral Pulses, brisk capillary refill. No clubbing, cyanosis, or edema  NERVOUS SYSTEM:  Alert & Oriented X3, speech clear. No deficits

## 2020-03-19 NOTE — PROGRESS NOTE ADULT - ASSESSMENT
69 year old male with PMhx of HTN and HLD presents to the ED for evaluation of confusion, was found to have brain masses. Pt was consulted by neurosurgery, pt was started on Decadron and Keppra for seizure prophylaxis. CT abdomen/pelvis and chest were negative for significant findings.      A/P     # Confusion/ Multiple brain lesions   - likely malignant lesions ( MTs vs primary brain malignancy)  - pt was consulted by neurosurgery, pt might needs brain biopsy ( CT negative for any other lesions)   - started on decadron and Keppra   - will get bone scan today and consult medical oncology   - fall precautions, will d/c with rolling walker     # HTN:   - c/w lisinopril  - DASH diet     # DLD:   - c/w statin    # Diet: DASH/TLC  # Dvt ppx: Lovenox  # Gi ppx: PPI    #Progress Note Handoff  Pending (specify):  obtain bone scan, consult medical oncology and call neurosurgery follow up   Family discussion: I spoke with wife at length late last night, plan of care discussed.   Disposition: Home__x_/SNF___/Other________/Unknown at this time________

## 2020-03-19 NOTE — PROGRESS NOTE ADULT - SUBJECTIVE AND OBJECTIVE BOX
69y old Male who presents with a chief complaint of AMS, was found to have brain masses. Pt was consulted by neurosurgery, pt was started on Decadron and Keppra for seizure prophylaxis. CT abdomen/pelvis and chest were negative for significant findings.     Today pt is asking about discharge, he denies any complaints.     PAST MEDICAL & SURGICAL HISTORY  HLD (hyperlipidemia)  HTN (hypertension)  No significant past surgical history    SOCIAL HISTORY:  Negative for smoking/alcohol/drug use.     ALLERGIES:  No Known Allergies    VITALS:   T(C): 35.8 (19 Mar 2020 04:53), Max: 36.3 (18 Mar 2020 20:26)  T(F): 96.4 (19 Mar 2020 04:53), Max: 97.4 (18 Mar 2020 20:26)  HR: 71 (19 Mar 2020 08:59) (71 - 93)  BP: 116/67 (19 Mar 2020 04:53) (116/67 - 134/81)  BP(mean): --  RR: 20 (19 Mar 2020 04:53) (18 - 22)  SpO2: 96% (19 Mar 2020 08:59) (96% - 96%)    PHYSICAL EXAM:  GENERAL: NAD, lying in bed comfortably  CHEST/LUNG: Clear to auscultation bilaterally  HEART: Regular rate and rhythm; No murmurs, rubs, or gallops  ABDOMEN: Bowel sounds present; Soft, Nontender, Nondistended.   EXTREMITIES:  2+ Peripheral Pulses, brisk capillary refill. No clubbing, cyanosis, or edema  NERVOUS SYSTEM:  Alert & Oriented X3, speech clear. No deficits    LABS:                                   14.2   10.10 )-----------( 208      ( 19 Mar 2020 06:44 )             44.1   03-19    140  |  101  |  26<H>  ----------------------------<  118<H>  4.9   |  26  |  0.9    Ca    9.4      19 Mar 2020 06:44  Mg     2.1     03-18    TPro  6.9  /  Alb  4.2  /  TBili  0.4  /  DBili  x   /  AST  15  /  ALT  15  /  AlkPhos  93  03-19      RADIOLOGY:  < from: CT Head No Cont (03.17.20 @ 14:54) >  IMPRESSION:    Extensive edema in the left cerebral hemisphere, splenium of the corpus callosum with extension into the right parietal lobe, this results in mass effect upon the adjacent left lateral ventricle and third ventricle. Underlying lesions were demonstrated on the prior MRI of the brain dated March 12, 2020, please see that report for further information.    < end of copied text >  < from: MR Head w/wo IV Cont (03.12.20 @ 10:32) >    Impression:    1.  3 necrotic appearing enhancing brain masses    2.  Approximately 3 cm mass corpus callosum. 3.5 cm mass left temporal lobe, 3 cm mass left occipital lobe with surrounding edema and mass effect    3.  The differential diagnosis includes multifocal GBM, given the unilateral hemispheric location and corpus callosum. Can consider lymphoma (less likely) and metastatic disease    4.  About 5 mm left right midline shift    < from: CT Abdomen and Pelvis w/ Oral Cont and w/ IV Cont (03.18.20 @ 13:10) >  IMPRESSION:     1. No evidence of intra-abdominal or pelvic metastasis.    2. Scattered sigmoid colonic diverticula, without evidence of diverticulitis.    < end of copied text >    < from: CT Chest w/ IV Cont (03.18.20 @ 13:10) >  IMPRESSION:    Lucent lesion in the manubrium which is suspicious in the setting of known malignancy. Nuclear medicine bone scan may be obtained for further evaluation.     Please see separate report for concurrent evaluation of the abdomen and pelvis.    < end of copied text >    < end of copied text >  < from: NM Nuclear Stress Pharmacologic Multiple (04.19.19 @ 14:30) >  Impression:  1. IV Adenosine Dual Isotope Study which was negative with respect to   symptoms and EKG changes.  2. Myocardial perfusion imaging reveals no fixed no reperfusion defects  3. Gated imaging reveals normal wall motion thickening and ejection   fraction  Recommendation:  Medicaltherapy.    Risk factor modification    < end of copied text >    MEDICATIONS  (STANDING):  atorvastatin 40 milliGRAM(s) Oral at bedtime  dexAMETHasone     Tablet 4 milliGRAM(s) Oral every 6 hours  enoxaparin Injectable 40 milliGRAM(s) SubCutaneous daily  levETIRAcetam 500 milliGRAM(s) Oral two times a day  lisinopril 2.5 milliGRAM(s) Oral daily

## 2020-03-19 NOTE — PROGRESS NOTE ADULT - ASSESSMENT
69 year old male with PMhx of HTN and HLD presents to the ED for evaluation of confusion.    # Confusion and disorientation most likely secondary to multiple brain masses - suspicious of multifocal GBM vs metastatic disease:  - patient is hemodynamically stable  - recent MRI 03/12/20 with 3 necrotic appearing brain masses with mass effect, surrounding edema and midline shift  - CT 03/17/20 extensive edema in left cerebral hemisphere, splenium of corpus collasum, extension into right parietal lobe, similar to previous MRI  - CT A/P 3/19 negative for abdominal or pelvic tumor, no mass on CT chest. Pt has radiolucency in manubrium of sterni observed    - seen by neurosurgery, will plan for possible biopsy and mass resection on Monday 03/23  - c/w decadron and keppra  - f/u NM bone scan, heme/onc, NSx    # HTN: c/w lisinopril  # DLD: - c/w statin    # Diet: DASH/TLC  # Dvt ppx: Lovenox  # Gi ppx: PPI  # Activity: AAT  # Dispo: home, uses cane, lives with wife and daughter  # Fullcode

## 2020-03-20 PROBLEM — Z00.00 ENCOUNTER FOR PREVENTIVE HEALTH EXAMINATION: Status: ACTIVE | Noted: 2020-01-01

## 2020-03-20 NOTE — PROGRESS NOTE ADULT - ASSESSMENT
69 year old male with PMhx of HTN and HLD presents to the ED for evaluation of confusion, was found to have brain masses. Pt was consulted by neurosurgery, pt was started on Decadron and Keppra for seizure prophylaxis. CT abdomen/pelvis and chest were negative for significant findings.      A/P     # Confusion/ Multiple brain lesions   - likely malignant lesions ( MTs vs primary brain malignancy)  - pt was consulted by neurosurgery, brain biopsy was scheduled for Monday, please, contact wife for consent   -  on decadron and Keppra   - repeat brain MRI done today   -  pt refused bone scan   -  case d/w  medical oncology attending today   - fall precautions, will d/c with rolling walker     # HTN:   - c/w lisinopril  - DASH diet     # DLD:   - c/w statin    # Diet: DASH/TLC  # Dvt ppx: Lovenox  # Gi ppx: PPI    #Progress Note Handoff  Pending (specify):  no need for daily blood work, brain biopsy on Monday  Family discussion: I arranged a hospital visit for pt's wife last night, case discussed with pt and wife at length pt was convinced to stay in the hospital for brain biopsy   Disposition: Home__x_/SNF___/Other________/Unknown at this time________

## 2020-03-20 NOTE — PROGRESS NOTE ADULT - ASSESSMENT
69 year old male with PMhx of HTN and HLD presents to the ED for evaluation of confusion.    # Confusion and disorientation most likely secondary to multiple brain masses - suspicious of multifocal GBM vs metastatic disease:  - patient is hemodynamically stable  - recent MRI 03/12/20 with 3 necrotic appearing brain masses with mass effect, surrounding edema and midline shift  - CT 03/17/20 extensive edema in left cerebral hemisphere, splenium of corpus collasum, extension into right parietal lobe, similar to previous MRI  - CT A/P 3/19 negative for abdominal or pelvic tumor, no mass on CT chest. Pt has radiolucency in manubrium of sterni observed    - seen by neurosurgery, will plan for possible biopsy and mass resection on Monday 03/23  - c/w decadron and Keppra  - f/u NM bone scan, heme/onc, NSx    # HTN: c/w lisinopril  # DLD: - c/w statin    # Diet: DASH/TLC  # Dvt ppx: SCD  # Gi ppx: PPI  # Activity: AAT  # Dispo: home, uses cane, lives with wife and daughter  # Fullcode 69 year old male with PMhx of HTN and HLD presents to the ED for evaluation of confusion.    # Confusion and disorientation most likely secondary to multiple brain masses - suspicious of multifocal GBM vs metastatic disease:  - patient is hemodynamically stable  - recent MRI 03/12/20 with 3 necrotic appearing brain masses with mass effect, surrounding edema and midline shift  - CT 03/17/20 extensive edema in left cerebral hemisphere, splenium of corpus collasum, extension into right parietal lobe, similar to previous MRI  - CT A/P 3/19 negative for abdominal or pelvic tumor, no mass on CT chest. Pt has radiolucency in manubrium of sterni observed    - repeat MRI 03/20 shows stable 3 lesions as previous MRI, extensive edema and midline shift  - seen by neurosurgery, will plan for possible biopsy and mass resection on Monday 03/23  - c/w decadron and Keppra  - f/u CEA, , PSA  - f/u NM bone scan, heme/onc, NSx    # HTN: c/w lisinopril  # DLD: - c/w statin    # Diet: DASH/TLC  # Dvt ppx: SCD  # Gi ppx: PPI  # Activity: AAT  # Dispo: home, uses cane, lives with wife and daughter  # Fullcode

## 2020-03-20 NOTE — PROGRESS NOTE ADULT - SUBJECTIVE AND OBJECTIVE BOX
SUBJECTIVE:    Patient is a 69y old Male who presents with a chief complaint of   Currently admitted to medicine with the primary diagnosis of Brain mass     Today is hospital day 3d. This morning he is resting comfortably in bed and reports no new issues or overnight events.     PAST MEDICAL & SURGICAL HISTORY  HLD (hyperlipidemia)  HTN (hypertension)  No significant past surgical history    SOCIAL HISTORY:  Negative for smoking/alcohol/drug use.     ALLERGIES:  No Known Allergies    MEDICATIONS:  STANDING MEDICATIONS  atorvastatin 40 milliGRAM(s) Oral at bedtime  dexAMETHasone     Tablet 4 milliGRAM(s) Oral every 6 hours  enoxaparin Injectable 40 milliGRAM(s) SubCutaneous daily  levETIRAcetam 500 milliGRAM(s) Oral two times a day  lisinopril 2.5 milliGRAM(s) Oral daily    PRN MEDICATIONS    VITALS:   T(F): 97.3  HR: 63  BP: 128/81  RR: 18  SpO2: 96%    LABS:                        14.2   10.10 )-----------( 208      ( 19 Mar 2020 06:44 )             44.1     03-19    140  |  101  |  26<H>  ----------------------------<  118<H>  4.9   |  26  |  0.9    Ca    9.4      19 Mar 2020 06:44    TPro  6.9  /  Alb  4.2  /  TBili  0.4  /  DBili  x   /  AST  15  /  ALT  15  /  AlkPhos  93  03-19                  RADIOLOGY:  < from: CT Abdomen and Pelvis w/ Oral Cont and w/ IV Cont (03.18.20 @ 13:10) >  IMPRESSION:     1. No evidence of intra-abdominal or pelvic metastasis.    2. Scattered sigmoid colonic diverticula, without evidence of diverticulitis.    < end of copied text >  < from: CT Chest w/ IV Cont (03.18.20 @ 13:10) >  IMPRESSION:    Lucent lesion in the manubrium which is suspicious in the setting of known malignancy. Nuclear medicine bone scan may be obtained for further evaluation.     Please see separate report for concurrent evaluation of the abdomen and pelvis.    < end of copied text >  < from: Xray Chest 1 View AP/PA (03.17.20 @ 16:41) >  Impression:    Mild interstitial opacities.    Heart appears enlarged, limited in evaluation due to AP portable technique    < end of copied text >  < from: CT Head No Cont (03.17.20 @ 14:54) >  IMPRESSION:    Extensive edema in the left cerebral hemisphere, splenium of the corpus callosum with extension into the right parietal lobe, this results in mass effect upon the adjacent left lateral ventricle and third ventricle. Underlying lesions were demonstrated on the prior MRI of the brain dated March 12, 2020, please see that report for further information.    < end of copied text >  < from: MR Head w/wo IV Cont (03.12.20 @ 10:32) >  Impression:    1.  3 necrotic appearing enhancing brain masses    2.  Approximately 3 cm mass corpus callosum. 3.5 cm mass left temporal lobe, 3 cm mass left occipital lobe with surrounding edema and mass effect    3.  The differential diagnosis includes multifocal GBM, given the unilateral hemispheric location and corpus callosum. Can consider lymphoma (less likely) and metastatic disease    4.  About 5 mm left right midline shift    < end of copied text >    PHYSICAL EXAM:  GENERAL: NAD, lying in bed comfortably  CHEST/LUNG: Clear to auscultation bilaterally; No rales, rhonchi, wheezing, or rubs.   HEART: Regular rate and rhythm; No murmurs, rubs, or gallops  ABDOMEN: Bowel sounds present; Soft, Nontender, Nondistended.   EXTREMITIES:  2+ Peripheral Pulses, brisk capillary refill. No clubbing, cyanosis, or edema  NERVOUS SYSTEM:  Alert & Oriented X3, speech clear. No deficits SUBJECTIVE:    Patient is a 69y old Male who presents with a chief complaint of   Currently admitted to medicine with the primary diagnosis of Brain mass     Today is hospital day 3d. This morning he is resting comfortably in bed and reports no new issues or overnight events.     PAST MEDICAL & SURGICAL HISTORY  HLD (hyperlipidemia)  HTN (hypertension)  No significant past surgical history    SOCIAL HISTORY:  Negative for smoking/alcohol/drug use.     ALLERGIES:  No Known Allergies    MEDICATIONS:  STANDING MEDICATIONS  atorvastatin 40 milliGRAM(s) Oral at bedtime  dexAMETHasone     Tablet 4 milliGRAM(s) Oral every 6 hours  enoxaparin Injectable 40 milliGRAM(s) SubCutaneous daily  levETIRAcetam 500 milliGRAM(s) Oral two times a day  lisinopril 2.5 milliGRAM(s) Oral daily    PRN MEDICATIONS    VITALS:   T(F): 97.3  HR: 63  BP: 128/81  RR: 18  SpO2: 96%    LABS:                        14.2   10.10 )-----------( 208      ( 19 Mar 2020 06:44 )             44.1     03-19    140  |  101  |  26<H>  ----------------------------<  118<H>  4.9   |  26  |  0.9    Ca    9.4      19 Mar 2020 06:44    TPro  6.9  /  Alb  4.2  /  TBili  0.4  /  DBili  x   /  AST  15  /  ALT  15  /  AlkPhos  93  03-19                  RADIOLOGY:  < from: MR Head w/ IV Cont (03.20.20 @ 09:49) >  IMPRESSION:    1.  Follow-up study to March 12 2 included BrainLab    2.  Stable appearance of 3 enhancingmasses (corpus callosum splenium, left temporal lobe, left occipital lobe)    3.  Extensive edema and midline shift stable    4.  Please see previous report as well.     < end of copied text >    < from: CT Abdomen and Pelvis w/ Oral Cont and w/ IV Cont (03.18.20 @ 13:10) >  IMPRESSION:     1. No evidence of intra-abdominal or pelvic metastasis.    2. Scattered sigmoid colonic diverticula, without evidence of diverticulitis.    < end of copied text >  < from: CT Chest w/ IV Cont (03.18.20 @ 13:10) >  IMPRESSION:    Lucent lesion in the manubrium which is suspicious in the setting of known malignancy. Nuclear medicine bone scan may be obtained for further evaluation.     Please see separate report for concurrent evaluation of the abdomen and pelvis.    < end of copied text >  < from: Xray Chest 1 View AP/PA (03.17.20 @ 16:41) >  Impression:    Mild interstitial opacities.    Heart appears enlarged, limited in evaluation due to AP portable technique    < end of copied text >  < from: CT Head No Cont (03.17.20 @ 14:54) >  IMPRESSION:    Extensive edema in the left cerebral hemisphere, splenium of the corpus callosum with extension into the right parietal lobe, this results in mass effect upon the adjacent left lateral ventricle and third ventricle. Underlying lesions were demonstrated on the prior MRI of the brain dated March 12, 2020, please see that report for further information.    < end of copied text >  < from: MR Head w/wo IV Cont (03.12.20 @ 10:32) >  Impression:    1.  3 necrotic appearing enhancing brain masses    2.  Approximately 3 cm mass corpus callosum. 3.5 cm mass left temporal lobe, 3 cm mass left occipital lobe with surrounding edema and mass effect    3.  The differential diagnosis includes multifocal GBM, given the unilateral hemispheric location and corpus callosum. Can consider lymphoma (less likely) and metastatic disease    4.  About 5 mm left right midline shift    < end of copied text >    PHYSICAL EXAM:  GENERAL: NAD, lying in bed comfortably  CHEST/LUNG: Clear to auscultation bilaterally; No rales, rhonchi, wheezing, or rubs.   HEART: Regular rate and rhythm; No murmurs, rubs, or gallops  ABDOMEN: Bowel sounds present; Soft, Nontender, Nondistended.   EXTREMITIES:  2+ Peripheral Pulses, brisk capillary refill. No clubbing, cyanosis, or edema  NERVOUS SYSTEM:  Alert & Oriented X3, speech clear. No deficits

## 2020-03-20 NOTE — CONSULT NOTE ADULT - ASSESSMENT
69 year old male with PMhx of HTN and HLD presents to the ED for evaluation of confusion. supplemental history obtained from the wife over the phone as the patient is disoriented. Per family over past 2 months patient endorses intermittent headache and has been more forgetful and confused.    # New diagnosis of brain Lesions and incidental finding of lucent lesion in manubrium :   The differential diagnosis includes multifocal GBM, given the unilateral hemispheric location and corpus callosum. Can consider lymphoma (less likely) and metastatic disease , lucent lesion in maubrium.  - Discussed with pt in detail , he is agreeable to biopsy and nuclear bone scan.  - If nuclear bone scan does show a lesion in manubrium , will recommend biopsy of lesion rather then brain biopsy .  - Spoke with Neurosurgery as well, imaging is more suggestive of primary brain tumor such as GBM .   - Pt is on schedule for biopsy on 3/23/20 .   - Pt was told we will be able to discuss treatment options once biopsy is available.   - Also check PSA ,  and CEA .   - Will f/u with pt once biopsy results are available.

## 2020-03-20 NOTE — CONSULT NOTE ADULT - SUBJECTIVE AND OBJECTIVE BOX
Patient is a 69y old  Male who presents with a chief complaint of     HPI:  69 year old male with PMhx of HTN and HLD presents to the ED for evaluation of confusion. supplemental history obtained from the wife over the phone as the patient is disoriented. Per family over past 2 months patient endorses intermittent headache and has been more forgetful and confused. patient had MRI as out patient 3/12/20 which showed multiple masses in brain prompting visit to the ED per PMD for further eval.  Pt has no complaints at this time. patient denies headache, blurry vision, motor or sensory deficits, change in bowel or urinary habits, fever, chills, nausea or vomiting.    in the ed patient is hemodynamically stable.  CT scan with brain masses and brain edema and mass effect.  admitted to medicine for further management. (17 Mar 2020 18:06)         PAST MEDICAL & SURGICAL HISTORY:  HLD (hyperlipidemia)  HTN (hypertension)  No significant past surgical history      SOCIAL HISTORY: No h/o smoking , alcohol or dug use     FAMILY HISTORY: NO SIGNIFICANT FAMILY HISTORY     Allergies    No Known Allergies    Height (cm): 167.6 (03-20-20 @ 10:24)  Weight (kg): 148 (03-20-20 @ 10:24)  BMI (kg/m2): 52.7 (03-20-20 @ 10:24)  BSA (m2): 2.46 (03-20-20 @ 10:24)      HOME MEDICATIONS:  aspirin 81 mg oral tablet: 1 tab(s) orally once a day (17 Mar 2020 18:30)  lisinopril 2.5 mg oral tablet: 1 tab(s) orally once a day (17 Mar 2020 18:30)  rosuvastatin 10 mg oral tablet: 1 tab(s) orally once a day (17 Mar 2020 18:30)      Vital Signs Last 24 Hrs  T(C): 35.5 (20 Mar 2020 12:33), Max: 36.3 (20 Mar 2020 04:15)  T(F): 95.9 (20 Mar 2020 12:33), Max: 97.3 (20 Mar 2020 04:15)  HR: 70 (20 Mar 2020 12:33) (63 - 88)  BP: 139/82 (20 Mar 2020 12:33) (115/67 - 151/79)  BP(mean): --  RR: 20 (20 Mar 2020 12:33) (18 - 20)  SpO2: 96% (20 Mar 2020 10:24) (96% - 96%)    PHYSICAL EXAM  General: adult in NAD, OBESE MALE LYING ON BED   HEENT: nc/at perrla  CV: normal S1/S2   Lungs: positive air movement b/l ant lungs,clear to auscultation, no wheezes, no rales  Abdomen: soft non-tender non-distended, no hepatosplenomegaly  Ext: no clubbing cyanosis or edema  Skin: no rashes and no petechiae  Neuro: alert and oriented X 4, no focal deficits    MEDICATIONS  (STANDING):  atorvastatin 40 milliGRAM(s) Oral at bedtime  dexAMETHasone     Tablet 4 milliGRAM(s) Oral every 6 hours  levETIRAcetam 500 milliGRAM(s) Oral two times a day  lisinopril 2.5 milliGRAM(s) Oral daily    MEDICATIONS  (PRN):      LABS:                          14.2   8.87  )-----------( 285      ( 20 Mar 2020 07:42 )             44.0         Mean Cell Volume : 85.1 fL  Mean Cell Hemoglobin : 27.5 pg  Mean Cell Hemoglobin Concentration : 32.3 g/dL  Auto Neutrophil # : 7.41 K/uL  Auto Lymphocyte # : 0.99 K/uL  Auto Monocyte # : 0.42 K/uL  Auto Eosinophil # : 0.00 K/uL  Auto Basophil # : 0.01 K/uL  Auto Neutrophil % : 83.5 %  Auto Lymphocyte % : 11.2 %  Auto Monocyte % : 4.7 %  Auto Eosinophil % : 0.0 %  Auto Basophil % : 0.1 %      Serial CBC's  03-20 @ 07:42  Hct-44.0 / Hgb-14.2 / Plat-285 / RBC-5.17 / WBC-8.87  Serial CBC's  03-19 @ 06:44  Hct-44.1 / Hgb-14.2 / Plat-208 / RBC-5.15 / WBC-10.10  Serial CBC's  03-18 @ 06:33  Hct-43.3 / Hgb-14.6 / Plat-273 / RBC-5.00 / WBC-7.07  Serial CBC's  03-17 @ 14:20  Hct-42.3 / Hgb-14.0 / Plat-254 / RBC-4.88 / WBC-7.78      03-20    136  |  98  |  27<H>  ----------------------------<  133<H>  4.8   |  27  |  0.8    Ca    9.2      20 Mar 2020 07:42    TPro  6.8  /  Alb  4.3  /  TBili  0.4  /  DBili  x   /  AST  14  /  ALT  17  /  AlkPhos  88  03-20                                  BLOOD SMEAR INTERPRETATION:       RADIOLOGY & ADDITIONAL STUDIES:    < from: MR Head w/ IV Cont (03.20.20 @ 09:49) >  IMPRESSION:    1.  Follow-up study to March 12 2 included BrainLab    2.  Stable appearance of 3 enhancingmasses (corpus callosum splenium, left temporal lobe, left occipital lobe)    3.  Extensive edema and midline shift stable    4.  Please see previous report as well.         < end of copied text >  < from: CT Abdomen and Pelvis w/ Oral Cont and w/ IV Cont (03.18.20 @ 13:10) >    IMPRESSION:     1. No evidence of intra-abdominal or pelvic metastasis.    2. Scattered sigmoid colonic diverticula, without evidence of diverticulitis.        Please see separately dictated report of concurrently performed thoracic CT for report of intrathoracic findings.            < end of copied text >  < from: CT Chest w/ IV Cont (03.18.20 @ 13:10) >    IMPRESSION:    Lucent lesion in the manubrium which is suspicious in the setting of known malignancy. Nuclear medicine bone scan may be obtained for further evaluation.     Please see separate report for concurrent evaluation of the abdomen and pelvis.          < end of copied text >

## 2020-03-20 NOTE — PROGRESS NOTE ADULT - SUBJECTIVE AND OBJECTIVE BOX
69y old Male who presents with a chief complaint of AMS, was found to have brain masses. Pt was consulted by neurosurgery, pt was started on Decadron and Keppra for seizure prophylaxis. CT abdomen/pelvis and chest were negative for significant findings.     Today pt denies any complaints, asking about biopsy.     PAST MEDICAL & SURGICAL HISTORY  HLD (hyperlipidemia)  HTN (hypertension)  No significant past surgical history    SOCIAL HISTORY:  Negative for smoking/alcohol/drug use.     ALLERGIES:  No Known Allergies    VITALS:   T(C): 35.5 (20 Mar 2020 12:33), Max: 36.3 (20 Mar 2020 04:15)  T(F): 95.9 (20 Mar 2020 12:33), Max: 97.3 (20 Mar 2020 04:15)  HR: 70 (20 Mar 2020 12:33) (63 - 88)  BP: 139/82 (20 Mar 2020 12:33) (115/67 - 151/79)  BP(mean): --  RR: 20 (20 Mar 2020 12:33) (18 - 20)  SpO2: 96% (20 Mar 2020 10:24) (96% - 96%)    PHYSICAL EXAM:  GENERAL: NAD, lying in bed comfortably  CHEST/LUNG: Clear to auscultation bilaterally  HEART: Regular rate and rhythm; No murmurs, rubs, or gallops  ABDOMEN: Bowel sounds present; Soft, Nontender, Nondistended.   EXTREMITIES:  2+ Peripheral Pulses, brisk capillary refill. No clubbing, cyanosis, or edema  NERVOUS SYSTEM:  Alert & Oriented X3, speech clear. No deficits    LABS:                        14.2   8.87  )-----------( 285      ( 20 Mar 2020 07:42 )             44.0                03-20    136  |  98  |  27<H>  ----------------------------<  133<H>  4.8   |  27  |  0.8    Ca    9.2      20 Mar 2020 07:42    TPro  6.8  /  Alb  4.3  /  TBili  0.4  /  DBili  x   /  AST  14  /  ALT  17  /  AlkPhos  88  03-20        RADIOLOGY:  < from: CT Head No Cont (03.17.20 @ 14:54) >  IMPRESSION:    Extensive edema in the left cerebral hemisphere, splenium of the corpus callosum with extension into the right parietal lobe, this results in mass effect upon the adjacent left lateral ventricle and third ventricle. Underlying lesions were demonstrated on the prior MRI of the brain dated March 12, 2020, please see that report for further information.    < end of copied text >  < from: MR Head w/wo IV Cont (03.12.20 @ 10:32) >    Impression:    1.  3 necrotic appearing enhancing brain masses    2.  Approximately 3 cm mass corpus callosum. 3.5 cm mass left temporal lobe, 3 cm mass left occipital lobe with surrounding edema and mass effect    3.  The differential diagnosis includes multifocal GBM, given the unilateral hemispheric location and corpus callosum. Can consider lymphoma (less likely) and metastatic disease    4.  About 5 mm left right midline shift    < from: CT Abdomen and Pelvis w/ Oral Cont and w/ IV Cont (03.18.20 @ 13:10) >  IMPRESSION:     1. No evidence of intra-abdominal or pelvic metastasis.    2. Scattered sigmoid colonic diverticula, without evidence of diverticulitis.    < end of copied text >    < from: CT Chest w/ IV Cont (03.18.20 @ 13:10) >  IMPRESSION:    Lucent lesion in the manubrium which is suspicious in the setting of known malignancy. Nuclear medicine bone scan may be obtained for further evaluation.     Please see separate report for concurrent evaluation of the abdomen and pelvis.    < end of copied text >    < end of copied text >  < from: NM Nuclear Stress Pharmacologic Multiple (04.19.19 @ 14:30) >  Impression:  1. IV Adenosine Dual Isotope Study which was negative with respect to   symptoms and EKG changes.  2. Myocardial perfusion imaging reveals no fixed no reperfusion defects  3. Gated imaging reveals normal wall motion thickening and ejection   fraction  Recommendation:  Medicaltherapy.    Risk factor modification    < from: MR Head w/ IV Cont (03.20.20 @ 09:49) >  IMPRESSION:    1.  Follow-up study to March 12 2 included BrainLab    2.  Stable appearance of 3 enhancingmasses (corpus callosum splenium, left temporal lobe, left occipital lobe)    3.  Extensive edema and midline shift stable    4.  Please see previous report as well.           MEDICATIONS  (STANDING):  atorvastatin 40 milliGRAM(s) Oral at bedtime  dexAMETHasone     Tablet 4 milliGRAM(s) Oral every 6 hours  levETIRAcetam 500 milliGRAM(s) Oral two times a day  lisinopril 2.5 milliGRAM(s) Oral daily

## 2020-03-20 NOTE — CONSULT NOTE ADULT - ATTENDING COMMENTS
Images were reviewed personally by myself, suspect multifocal brain tumor.  Patient is agreeable for bone scan over the weekend to assess for manubrium lesion noted on CT chest.

## 2020-03-21 NOTE — PROGRESS NOTE ADULT - SUBJECTIVE AND OBJECTIVE BOX
Hospital Course: Preop for craniotomy excision of brain mass, biopsy      Vital Signs Last 24 Hrs  T(C): 36.4 (21 Mar 2020 13:09), Max: 36.4 (21 Mar 2020 13:09)  T(F): 97.5 (21 Mar 2020 13:09), Max: 97.5 (21 Mar 2020 13:09)  HR: 87 (21 Mar 2020 13:09) (70 - 91)  BP: 135/76 (21 Mar 2020 13:09) (135/76 - 138/88)  BP(mean): --  RR: 20 (21 Mar 2020 13:09) (20 - 20)  SpO2: 96% (21 Mar 2020 02:48) (96% - 96%)    I&O's Detail    I&O's Summary      PHYSICAL EXAM:  Neurological: awake, alert, NAD, denies H/A, verbalizing well, cooperative, follows commands, ACEVEDO with good strength and coordination, no drift    LABS:                        14.3   10.37 )-----------( 279      ( 21 Mar 2020 05:06 )             43.1     03-21    137  |  101  |  29<H>  ----------------------------<  134<H>  4.9   |  25  |  1.0    Ca    9.0      21 Mar 2020 05:06    TPro  6.4  /  Alb  4.2  /  TBili  0.4  /  DBili  x   /  AST  18  /  ALT  25  /  AlkPhos  81  03-21        CAPILLARY BLOOD GLUCOSE      Drug Levels: [] N/A    CSF Analysis: [] N/A      Allergies    No Known Allergies    Intolerances      MEDICATIONS:  Antibiotics:    Neuro:  levETIRAcetam 500 milliGRAM(s) Oral two times a day    Anticoagulation:    OTHER:  atorvastatin 40 milliGRAM(s) Oral at bedtime  dexAMETHasone     Tablet 4 milliGRAM(s) Oral every 6 hours  lisinopril 2.5 milliGRAM(s) Oral daily    IVF:    CULTURES:    RADIOLOGY & ADDITIONAL TESTS: < from: MR Head w/ IV Cont (03.20.20 @ 09:49) >  IMPRESSION:    1.  Follow-up study to March 12 2 included BrainLab    2.  Stable appearance of 3 enhancingmasses (corpus callosum splenium, left temporal lobe, left occipital lobe)    3.  Extensive edema and midline shift stable    4.  Please see previous report as well.       < end of copied text >  EKG, CXR done in results      ASSESSMENT:  69y Male pre op for craniotomy Monday with Dr Velázquez    BRAIN MASS  ^SENT BY URGENT CARE FOR TUMORS IN HEAD  Handoff  MEWS Score  Brain mass  HLD (hyperlipidemia)  HTN (hypertension)  No pertinent past medical history  Brain mass  No significant past surgical history  SENT BY URGENT CARE FOR TUMORS IN HEAD      PLAN: will need medical clearance, PT, PTT, type and screen, npo after midnight Sunday night.

## 2020-03-21 NOTE — PROGRESS NOTE ADULT - SUBJECTIVE AND OBJECTIVE BOX
69y old Male who presents with a chief complaint of AMS, was found to have brain masses. Pt was consulted by neurosurgery, pt was started on Decadron and Keppra for seizure prophylaxis. CT abdomen/pelvis and chest were negative for significant findings.  Pt was consulted by oncology team, recommendations noted, refusing bone scan, scheduled for brain biopsy on Monday.    Today pt is comfortable, c/o knee pain, denies any other complaints.     PAST MEDICAL & SURGICAL HISTORY  HLD (hyperlipidemia)  HTN (hypertension)  No significant past surgical history    SOCIAL HISTORY:  Negative for smoking/alcohol/drug use.     ALLERGIES:  No Known Allergies    VITALS:   T(C): 36.4 (21 Mar 2020 13:09), Max: 36.4 (21 Mar 2020 13:09)  T(F): 97.5 (21 Mar 2020 13:09), Max: 97.5 (21 Mar 2020 13:09)  HR: 87 (21 Mar 2020 13:09) (70 - 91)  BP: 135/76 (21 Mar 2020 13:09) (135/76 - 138/88)  RR: 20 (21 Mar 2020 13:09) (20 - 20)  SpO2: 96% (21 Mar 2020 02:48) (96% - 96%)    PHYSICAL EXAM:  GENERAL: NAD, lying in bed comfortably  CHEST/LUNG: Clear to auscultation bilaterally  HEART: Regular rate and rhythm; No murmurs, rubs, or gallops  ABDOMEN: Bowel sounds present; Soft, Nontender, Nondistended.   EXTREMITIES:  2+ Peripheral Pulses, brisk capillary refill. No clubbing, cyanosis, or edema  NERVOUS SYSTEM:  Alert & Oriented X3, speech clear. No deficits    LABS:                              14.3   10.37 )-----------( 279      ( 21 Mar 2020 05:06 )             43.1   03-21    137  |  101  |  29<H>  ----------------------------<  134<H>  4.9   |  25  |  1.0    Ca    9.0      21 Mar 2020 05:06    TPro  6.4  /  Alb  4.2  /  TBili  0.4  /  DBili  x   /  AST  18  /  ALT  25  /  AlkPhos  81  03-21    RADIOLOGY:  < from: CT Head No Cont (03.17.20 @ 14:54) >  IMPRESSION:    Extensive edema in the left cerebral hemisphere, splenium of the corpus callosum with extension into the right parietal lobe, this results in mass effect upon the adjacent left lateral ventricle and third ventricle. Underlying lesions were demonstrated on the prior MRI of the brain dated March 12, 2020, please see that report for further information.    < end of copied text >  < from: MR Head w/wo IV Cont (03.12.20 @ 10:32) >    Impression:    1.  3 necrotic appearing enhancing brain masses    2.  Approximately 3 cm mass corpus callosum. 3.5 cm mass left temporal lobe, 3 cm mass left occipital lobe with surrounding edema and mass effect    3.  The differential diagnosis includes multifocal GBM, given the unilateral hemispheric location and corpus callosum. Can consider lymphoma (less likely) and metastatic disease    4.  About 5 mm left right midline shift    < from: CT Abdomen and Pelvis w/ Oral Cont and w/ IV Cont (03.18.20 @ 13:10) >  IMPRESSION:     1. No evidence of intra-abdominal or pelvic metastasis.    2. Scattered sigmoid colonic diverticula, without evidence of diverticulitis.    < end of copied text >    < from: CT Chest w/ IV Cont (03.18.20 @ 13:10) >  IMPRESSION:    Lucent lesion in the manubrium which is suspicious in the setting of known malignancy. Nuclear medicine bone scan may be obtained for further evaluation.     Please see separate report for concurrent evaluation of the abdomen and pelvis.    < end of copied text >    < end of copied text >  < from: NM Nuclear Stress Pharmacologic Multiple (04.19.19 @ 14:30) >  Impression:  1. IV Adenosine Dual Isotope Study which was negative with respect to   symptoms and EKG changes.  2. Myocardial perfusion imaging reveals no fixed no reperfusion defects  3. Gated imaging reveals normal wall motion thickening and ejection   fraction  Recommendation:  Medicaltherapy.    Risk factor modification    < from: MR Head w/ IV Cont (03.20.20 @ 09:49) >  IMPRESSION:    1.  Follow-up study to March 12 2 included BrainLab    2.  Stable appearance of 3 enhancingmasses (corpus callosum splenium, left temporal lobe, left occipital lobe)    3.  Extensive edema and midline shift stable    4.  Please see previous report as well.           MEDICATIONS  (STANDING):  atorvastatin 40 milliGRAM(s) Oral at bedtime  dexAMETHasone     Tablet 4 milliGRAM(s) Oral every 6 hours  levETIRAcetam 500 milliGRAM(s) Oral two times a day  lisinopril 2.5 milliGRAM(s) Oral daily

## 2020-03-21 NOTE — PROGRESS NOTE ADULT - ASSESSMENT
69 year old male with PMhx of HTN and HLD presents to the ED for evaluation of confusion.    # Confusion and disorientation most likely secondary to multiple brain masses - suspicious of multifocal GBM vs metastatic disease:  - patient is hemodynamically stable  - recent MRI 03/12/20 with 3 necrotic appearing brain masses with mass effect, surrounding edema and midline shift  - CT 03/17/20 extensive edema in left cerebral hemisphere, splenium of corpus collasum, extension into right parietal lobe, similar to previous MRI  - CT A/P 3/19 negative for abdominal or pelvic tumor, no mass on CT chest. Pt has radiolucency in manubrium of sterni observed    - repeat MRI 03/20 shows stable 3 lesions as previous MRI, extensive edema and midline shift  - seen by neurosurgery, will plan for left craniotomy and biopsy on Monday 03/23  - c/w decadron and Keppra  - f/u CEA, , PSA  - f/u NM bone scan, heme/onc, NSx    # HTN: c/w lisinopril  # DLD: - c/w statin    # Diet: DASH/TLC  # Dvt ppx: SCD  # Gi ppx: PPI  # Activity: AAT  # Dispo: home, uses cane, lives with wife and daughter  # Fullcode 69 year old male with PMhx of HTN and HLD presents to the ED for evaluation of confusion. Recent MRI showed 3 brain masses suspected for GBM. Pt admitted for further workup. Neurosurgery have planned a left craniotomy and biopsy on Monday.     # Confusion and disorientation most likely secondary to multiple brain masses - suspicious of multifocal GBM vs metastatic disease:  - patient is hemodynamically stable  - recent MRI 03/12/20 with 3 necrotic appearing brain masses with mass effect, surrounding edema and midline shift  - CT 03/17/20 extensive edema in left cerebral hemisphere, splenium of corpus collasum, extension into right parietal lobe, similar to previous MRI  - CT A/P 3/19 negative for abdominal or pelvic tumor, no mass on CT chest. Pt has radiolucency in manubrium of sterni observed    - repeat MRI 03/20 shows stable 3 lesions as previous MRI, extensive edema and midline shift  - seen by neurosurgery, will plan for left craniotomy and biopsy on Monday 03/23  - c/w decadron and Keppra  - f/u CEA, , PSA  - f/u NM bone scan, heme/onc, NSx    # HTN: c/w lisinopril  # DLD: - c/w statin    # Diet: DASH/TLC  # Dvt ppx: SCD  # Gi ppx: PPI  # Activity: AAT  # Dispo: home, uses cane, lives with wife and daughter  # Fullcode

## 2020-03-21 NOTE — PROGRESS NOTE ADULT - ASSESSMENT
69 year old male with PMhx of HTN and HLD presents to the ED for evaluation of confusion, was found to have brain masses. Pt was consulted by neurosurgery, pt was started on Decadron and Keppra for seizure prophylaxis. CT abdomen/pelvis and chest were negative for significant findings. Pt was consulted by medical oncology, recommendations noted, pt is refusing bone scan ( refused twice) , agree for brain biopsy.     A/P     # Confusion/ Multiple brain lesions   - likely malignant lesions ( MTs vs primary brain malignancy( multifocal glioblastoma)  - pt was consulted by neurosurgery, brain biopsy was scheduled for Monday, please, contact wife for consent   - pt was consulted by medical oncology, recommendations noted ( pt refused bone scan 2nd time)   -  on decadron and Keppra   - repeat brain MRI done today   - fall precautions, will d/c with rolling walker     # HTN:   - c/w lisinopril ( increase the dose to 5 mg)   - DASH diet     # DLD:   - c/w statin    # Diet: DASH/TLC  # Dvt ppx: Lovenox  # Gi ppx: PPI    #Progress Note Handoff  Pending (specify):  no need for daily blood work, brain biopsy on Monday  Family discussion: n/a. I spoke with pt at length today, he agreed with a plan of care   Disposition: Home__x_/SNF___/Other________/Unknown at this time________

## 2020-03-21 NOTE — PROGRESS NOTE ADULT - SUBJECTIVE AND OBJECTIVE BOX
Asked to speak to patients wife by Medical staff about pending surgery, she wishes a second opinion at a hospital in [place], [facility] and wants her  to come home. She refuses surgery on Monday. Discussed with Dr Velázquez, he understands and agrees with her request.

## 2020-03-21 NOTE — PROGRESS NOTE ADULT - SUBJECTIVE AND OBJECTIVE BOX
SUBJECTIVE:    Patient is a 69y old Male who presents with a chief complaint of   Currently admitted to medicine with the primary diagnosis of Brain mass     Today is hospital day 4d. This morning he is resting comfortably in bed and reports no new issues or overnight events.     PAST MEDICAL & SURGICAL HISTORY  HLD (hyperlipidemia)  HTN (hypertension)  No significant past surgical history    SOCIAL HISTORY:  Negative for smoking/alcohol/drug use.     ALLERGIES:  No Known Allergies    MEDICATIONS:  STANDING MEDICATIONS  atorvastatin 40 milliGRAM(s) Oral at bedtime  dexAMETHasone     Tablet 4 milliGRAM(s) Oral every 6 hours  levETIRAcetam 500 milliGRAM(s) Oral two times a day  lisinopril 2.5 milliGRAM(s) Oral daily    PRN MEDICATIONS    VITALS:   T(F): 96.1  HR: 70  BP: 135/92  RR: 20  SpO2: 96%    LABS:                        14.3   10.37 )-----------( 279      ( 21 Mar 2020 05:06 )             43.1     03-21    137  |  101  |  29<H>  ----------------------------<  134<H>  4.9   |  25  |  1.0    Ca    9.0      21 Mar 2020 05:06    TPro  6.4  /  Alb  4.2  /  TBili  0.4  /  DBili  x   /  AST  18  /  ALT  25  /  AlkPhos  81  03-21                  RADIOLOGY:  < from: MR Head w/ IV Cont (03.20.20 @ 09:49) >  IMPRESSION:    1.  Follow-up study to March 12 2 included BrainLab    2.  Stable appearance of 3 enhancingmasses (corpus callosum splenium, left temporal lobe, left occipital lobe)    3.  Extensive edema and midline shift stable    4.  Please see previous report as well.     < end of copied text >    < from: CT Abdomen and Pelvis w/ Oral Cont and w/ IV Cont (03.18.20 @ 13:10) >  IMPRESSION:     1. No evidence of intra-abdominal or pelvic metastasis.    2. Scattered sigmoid colonic diverticula, without evidence of diverticulitis.    < end of copied text >  < from: CT Chest w/ IV Cont (03.18.20 @ 13:10) >  IMPRESSION:    Lucent lesion in the manubrium which is suspicious in the setting of known malignancy. Nuclear medicine bone scan may be obtained for further evaluation.     Please see separate report for concurrent evaluation of the abdomen and pelvis.    < end of copied text >  < from: Xray Chest 1 View AP/PA (03.17.20 @ 16:41) >  Impression:    Mild interstitial opacities.    Heart appears enlarged, limited in evaluation due to AP portable technique    < end of copied text >  < from: CT Head No Cont (03.17.20 @ 14:54) >  IMPRESSION:    Extensive edema in the left cerebral hemisphere, splenium of the corpus callosum with extension into the right parietal lobe, this results in mass effect upon the adjacent left lateral ventricle and third ventricle. Underlying lesions were demonstrated on the prior MRI of the brain dated March 12, 2020, please see that report for further information.    < end of copied text >  < from: MR Head w/wo IV Cont (03.12.20 @ 10:32) >  Impression:    1.  3 necrotic appearing enhancing brain masses    2.  Approximately 3 cm mass corpus callosum. 3.5 cm mass left temporal lobe, 3 cm mass left occipital lobe with surrounding edema and mass effect    3.  The differential diagnosis includes multifocal GBM, given the unilateral hemispheric location and corpus callosum. Can consider lymphoma (less likely) and metastatic disease    4.  About 5 mm left right midline shift    < end of copied text >    PHYSICAL EXAM:  GENERAL: NAD, lying in bed comfortably  CHEST/LUNG: Clear to auscultation bilaterally; No rales, rhonchi, wheezing, or rubs.   HEART: Regular rate and rhythm; No murmurs, rubs, or gallops  ABDOMEN: Bowel sounds present; Soft, Nontender, Nondistended.   EXTREMITIES:  2+ Peripheral Pulses, brisk capillary refill. No clubbing, cyanosis, or edema  NERVOUS SYSTEM:  Alert & Oriented X3, speech clear. No deficits

## 2020-03-22 NOTE — PROGRESS NOTE ADULT - ASSESSMENT
69 year old male with PMhx of HTN and HLD presents to the ED for evaluation of confusion, was found to have brain masses. Pt was consulted by neurosurgery, pt was started on Decadron and Keppra for seizure prophylaxis. CT abdomen/pelvis and chest were negative for significant findings. Pt was consulted by medical oncology, recommendations noted, pt is refusing bone scan and brain biopsy, will anticipate discharge on Monday, wife wants second opinion.     A/P     # Confusion/ Multiple brain lesions   - likely malignant lesions ( MTs vs primary brain malignancy( multifocal glioblastoma)  - pt was consulted by neurosurgery, brain biopsy was scheduled for Monday, pt and his wife finally refused biopsy   - pt was consulted by medical oncology, recommendations noted   -  on decadron and Keppra   - fall precautions, will d/c with rolling walker     # HTN:   - c/w lisinopril ( increase the dose to 5 mg)   - DASH diet     # DLD:   - c/w statin    # Diet: DASH/TLC  # Dvt ppx: Lovenox  # Gi ppx: PPI    #Progress Note Handoff  Pending (specify): pt and wife refused brain biopsy   Family discussion: n/a. I spoke with pt at length today, she refused brain biopsy, she wants second opinion.   Disposition: anticipate discharge home on Monday

## 2020-03-22 NOTE — PROGRESS NOTE ADULT - SUBJECTIVE AND OBJECTIVE BOX
69y old Male who presents with a chief complaint of AMS, was found to have brain masses. Pt was consulted by neurosurgery, pt was started on Decadron and Keppra for seizure prophylaxis. CT abdomen/pelvis and chest were negative for significant findings.  Pt was consulted by oncology team, recommendations noted, refusing bone scan,  brain biopsy on Monday, after multiple conversations with pts wife she finally refused it.   I spoke with her today, she wants to get second opinion, will anticipate discharge in 24 hours.    Today pt is comfortable, denies any complaints.     PAST MEDICAL & SURGICAL HISTORY  HLD (hyperlipidemia)  HTN (hypertension)  No significant past surgical history    SOCIAL HISTORY:  Negative for smoking/alcohol/drug use.     ALLERGIES:  No Known Allergies    VITALS:   T(C): 36.4 (22 Mar 2020 05:50), Max: 36.4 (21 Mar 2020 13:09)  T(F): 97.5 (22 Mar 2020 05:50), Max: 97.5 (21 Mar 2020 13:09)  HR: 59 (22 Mar 2020 05:50) (59 - 87)  BP: 142/85 (22 Mar 2020 05:50) (135/76 - 142/85)  BP(mean): --  RR: 18 (22 Mar 2020 05:50) (18 - 20)  SpO2: 96% (22 Mar 2020 01:36) (96% - 96%)    PHYSICAL EXAM:  GENERAL: NAD, lying in bed comfortably  CHEST/LUNG: Clear to auscultation bilaterally  HEART: Regular rate and rhythm; No murmurs, rubs, or gallops  ABDOMEN: Bowel sounds present; Soft, Nontender, Nondistended.   EXTREMITIES:  2+ Peripheral Pulses, brisk capillary refill. No clubbing, cyanosis, or edema  NERVOUS SYSTEM:  Alert & Oriented X3, speech clear. No deficits    LABS:                                         14.4   10.93 )-----------( 266      ( 22 Mar 2020 05:53 )             44.4   03-22    139  |  101  |  31<H>  ----------------------------<  136<H>  5.1<H>   |  25  |  1.0    Ca    9.1      22 Mar 2020 05:53    TPro  6.7  /  Alb  4.2  /  TBili  0.4  /  DBili  x   /  AST  15  /  ALT  29  /  AlkPhos  78  03-22      RADIOLOGY:  < from: CT Head No Cont (03.17.20 @ 14:54) >  IMPRESSION:    Extensive edema in the left cerebral hemisphere, splenium of the corpus callosum with extension into the right parietal lobe, this results in mass effect upon the adjacent left lateral ventricle and third ventricle. Underlying lesions were demonstrated on the prior MRI of the brain dated March 12, 2020, please see that report for further information.    < end of copied text >  < from: MR Head w/wo IV Cont (03.12.20 @ 10:32) >    Impression:    1.  3 necrotic appearing enhancing brain masses    2.  Approximately 3 cm mass corpus callosum. 3.5 cm mass left temporal lobe, 3 cm mass left occipital lobe with surrounding edema and mass effect    3.  The differential diagnosis includes multifocal GBM, given the unilateral hemispheric location and corpus callosum. Can consider lymphoma (less likely) and metastatic disease    4.  About 5 mm left right midline shift    < from: CT Abdomen and Pelvis w/ Oral Cont and w/ IV Cont (03.18.20 @ 13:10) >  IMPRESSION:     1. No evidence of intra-abdominal or pelvic metastasis.    2. Scattered sigmoid colonic diverticula, without evidence of diverticulitis.    < end of copied text >    < from: CT Chest w/ IV Cont (03.18.20 @ 13:10) >  IMPRESSION:    Lucent lesion in the manubrium which is suspicious in the setting of known malignancy. Nuclear medicine bone scan may be obtained for further evaluation.     Please see separate report for concurrent evaluation of the abdomen and pelvis.    < end of copied text >    < end of copied text >  < from: NM Nuclear Stress Pharmacologic Multiple (04.19.19 @ 14:30) >  Impression:  1. IV Adenosine Dual Isotope Study which was negative with respect to   symptoms and EKG changes.  2. Myocardial perfusion imaging reveals no fixed no reperfusion defects  3. Gated imaging reveals normal wall motion thickening and ejection   fraction  Recommendation:  Medicaltherapy.    Risk factor modification    < from: MR Head w/ IV Cont (03.20.20 @ 09:49) >  IMPRESSION:    1.  Follow-up study to March 12 2 included BrainLab    2.  Stable appearance of 3 enhancingmasses (corpus callosum splenium, left temporal lobe, left occipital lobe)    3.  Extensive edema and midline shift stable    4.  Please see previous report as well.           MEDICATIONS  (STANDING):  atorvastatin 40 milliGRAM(s) Oral at bedtime  dexAMETHasone     Tablet 4 milliGRAM(s) Oral every 6 hours  levETIRAcetam 500 milliGRAM(s) Oral two times a day  lisinopril 2.5 milliGRAM(s) Oral daily

## 2020-03-23 PROBLEM — I10 ESSENTIAL (PRIMARY) HYPERTENSION: Chronic | Status: ACTIVE | Noted: 2020-01-01

## 2020-03-23 PROBLEM — E78.5 HYPERLIPIDEMIA, UNSPECIFIED: Chronic | Status: ACTIVE | Noted: 2020-01-01

## 2020-03-23 NOTE — DISCHARGE NOTE PROVIDER - NSDCMRMEDTOKEN_GEN_ALL_CORE_FT
aspirin 81 mg oral tablet: 1 tab(s) orally once a day  lisinopril 2.5 mg oral tablet: 1 tab(s) orally once a day  rosuvastatin 10 mg oral tablet: 1 tab(s) orally once a day dexamethasone 4 mg oral tablet: 1 tab(s) orally every 6 hours  levETIRAcetam 500 mg oral tablet: 1 tab(s) orally 2 times a day  lisinopril 2.5 mg oral tablet: 1 tab(s) orally once a day  rosuvastatin 10 mg oral tablet: 1 tab(s) orally once a day

## 2020-03-23 NOTE — PROGRESS NOTE ADULT - SUBJECTIVE AND OBJECTIVE BOX
69y old Male who presents with a chief complaint of AMS, was found to have brain masses. Pt was consulted by neurosurgery, pt was started on Decadron and Keppra for seizure prophylaxis. CT abdomen/pelvis and chest were negative for significant findings.  Pt was consulted by oncology team, recommendations noted, refusing bone scan,  brain biopsy on Monday, after multiple conversations with pts wife she finally refused it.   I spoke with pt's wife, she wants to get second opinion, refused brain biopsy, will discharge pt home today.   Pt feels great, can not wait to go home, no complaints.     PAST MEDICAL & SURGICAL HISTORY  HLD (hyperlipidemia)  HTN (hypertension)  No significant past surgical history    SOCIAL HISTORY:  Negative for smoking/alcohol/drug use.     ALLERGIES:  No Known Allergies    VITALS:   T(C): 36 (23 Mar 2020 05:35), Max: 36 (23 Mar 2020 05:35)  T(F): 96.8 (23 Mar 2020 05:35), Max: 96.8 (23 Mar 2020 05:35)  HR: 65 (23 Mar 2020 05:35) (65 - 77)  BP: 135/83 (23 Mar 2020 05:35) (115/59 - 135/83)  BP(mean): --  RR: 20 (23 Mar 2020 05:35) (18 - 20)  SpO2: 96% (22 Mar 2020 19:30) (96% - 96%)    PHYSICAL EXAM:  GENERAL: NAD, lying in bed comfortably, morbidly obese   CHEST/LUNG: Clear to auscultation bilaterally  HEART: Regular rate and rhythm; No murmurs, rubs, or gallops  ABDOMEN: Bowel sounds present; Soft, Nontender, Nondistended.   EXTREMITIES:  2+ Peripheral Pulses, brisk capillary refill. No clubbing, cyanosis, or edema  NERVOUS SYSTEM:  Alert & Oriented X3, speech clear. No deficits    LABS:                                                  14.8   13.22 )-----------( 273      ( 23 Mar 2020 06:45 )             43.6      03-23    135  |  98  |  31<H>  ----------------------------<  135<H>  4.9   |  25  |  1.0    Ca    9.0      23 Mar 2020 06:45    TPro  6.4  /  Alb  4.2  /  TBili  0.4  /  DBili  x   /  AST  14  /  ALT  29  /  AlkPhos  79  03-23        RADIOLOGY:  < from: CT Head No Cont (03.17.20 @ 14:54) >  IMPRESSION:    Extensive edema in the left cerebral hemisphere, splenium of the corpus callosum with extension into the right parietal lobe, this results in mass effect upon the adjacent left lateral ventricle and third ventricle. Underlying lesions were demonstrated on the prior MRI of the brain dated March 12, 2020, please see that report for further information.    < end of copied text >  < from: MR Head w/wo IV Cont (03.12.20 @ 10:32) >    Impression:    1.  3 necrotic appearing enhancing brain masses    2.  Approximately 3 cm mass corpus callosum. 3.5 cm mass left temporal lobe, 3 cm mass left occipital lobe with surrounding edema and mass effect    3.  The differential diagnosis includes multifocal GBM, given the unilateral hemispheric location and corpus callosum. Can consider lymphoma (less likely) and metastatic disease    4.  About 5 mm left right midline shift    < from: CT Abdomen and Pelvis w/ Oral Cont and w/ IV Cont (03.18.20 @ 13:10) >  IMPRESSION:     1. No evidence of intra-abdominal or pelvic metastasis.    2. Scattered sigmoid colonic diverticula, without evidence of diverticulitis.    < end of copied text >    < from: CT Chest w/ IV Cont (03.18.20 @ 13:10) >  IMPRESSION:    Lucent lesion in the manubrium which is suspicious in the setting of known malignancy. Nuclear medicine bone scan may be obtained for further evaluation.     Please see separate report for concurrent evaluation of the abdomen and pelvis.    < end of copied text >    < end of copied text >  < from: NM Nuclear Stress Pharmacologic Multiple (04.19.19 @ 14:30) >  Impression:  1. IV Adenosine Dual Isotope Study which was negative with respect to   symptoms and EKG changes.  2. Myocardial perfusion imaging reveals no fixed no reperfusion defects  3. Gated imaging reveals normal wall motion thickening and ejection   fraction  Recommendation:  Medicaltherapy.    Risk factor modification    < from: MR Head w/ IV Cont (03.20.20 @ 09:49) >  IMPRESSION:    1.  Follow-up study to March 12 2 included BrainLab    2.  Stable appearance of 3 enhancingmasses (corpus callosum splenium, left temporal lobe, left occipital lobe)    3.  Extensive edema and midline shift stable    4.  Please see previous report as well.           MEDICATIONS  (STANDING):  atorvastatin 40 milliGRAM(s) Oral at bedtime  dexAMETHasone     Tablet 4 milliGRAM(s) Oral every 6 hours  levETIRAcetam 500 milliGRAM(s) Oral two times a day  lisinopril 2.5 milliGRAM(s) Oral daily

## 2020-03-23 NOTE — PROGRESS NOTE ADULT - ASSESSMENT
I explained to the patient the need to do a brain biopsy and get a bone scan as soon as possible for making treatment decisions. However, he wants to go to a facility in [place] for second opinion, despite knowing the risks of delaying the procedure. He has the number to our clinic and is scheduled to see Dr. Gold on Mar 31st, 2020 at 11:30 am.     Case discussed with medical team and Dr. Gold.

## 2020-03-23 NOTE — DISCHARGE NOTE PROVIDER - NSDCFUADDINST_GEN_ALL_CORE_FT
-you are not medically cleared to drive a vehicle -you are not medically cleared to drive a vehicle  -please take decadron and keppra daily, follow up with your primary care provider to discuss if/when to stop taking medications

## 2020-03-23 NOTE — DISCHARGE NOTE PROVIDER - NSDCFUADDAPPT_GEN_ALL_CORE_FT
Please make an appointment ASAP to discuss your brain image findings with both a neurosurgeon and your primary care provider

## 2020-03-23 NOTE — DISCHARGE NOTE PROVIDER - CARE PROVIDERS DIRECT ADDRESSES
[email or web address]

## 2020-03-23 NOTE — PROGRESS NOTE ADULT - ASSESSMENT
69 year old male with PMhx of HTN and HLD presents to the ED for evaluation of confusion, was found to have brain masses. Pt was consulted by neurosurgery, pt was started on Decadron and Keppra for seizure prophylaxis. CT abdomen/pelvis and chest were negative for significant findings. Pt was consulted by medical oncology, recommendations noted, pt is refusing bone scan and brain biopsy, will discharge home today on Decadron and Keppra.     A/P     # Confusion/ Multiple brain lesions   - likely malignant lesions ( MTs vs primary brain malignancy( multifocal glioblastoma)  - pt was consulted by neurosurgery, brain biopsy was scheduled for Monday, pt and his wife finally refused, wanted second opinion   - pt was consulted by medical oncology, recommendations noted , f/u with oncology clinic after discharge   -  on decadron and Keppra ( no taper)   - fall precautions, will d/c with rolling walker     # Leukocytosis  - likely due to steroids   - no clinical sings of any infection     # HTN:   - c/w lisinopril    - DASH diet     # DLD:   - c/w statin    # Diet: DASH/TLC  # Dvt ppx: Lovenox  # Gi ppx: PPI    #Progress Note Handoff  Pending (specify): none   Family discussion: n/a  Disposition: pt is stable for discharge home, his prognosis is guarded  ( wife and pt aware), pt needs brain biopsy ASAP ( radiological findings suspicious for multifocal GBM), wife was instructed to follow up with PMD and get second opinion as soon as she can.

## 2020-03-23 NOTE — DISCHARGE NOTE NURSING/CASE MANAGEMENT/SOCIAL WORK - PATIENT PORTAL LINK FT
You can access the FollowMyHealth Patient Portal offered by [facility] by registering at the following website: [email or web address]. By joining [employer]: [employer] off [employer]’s FollowMyHealth portal, you will also be able to view your health information using other applications (apps) compatible with our system.

## 2020-03-23 NOTE — DISCHARGE NOTE PROVIDER - CARE PROVIDER_API CALL
Yazmin Nino)  Internal Medicine  [address] [place] [address]  Phone: [phone]  Fax: [phone]  Follow Up Time: 1-3 days Yazmin Nino)  Internal Medicine  [address] [place] [address]  Phone: [phone]  Fax: [phone]  Follow Up Time: 1-3 days    Darby Garcia)  HematologyOncology; Internal Medicine; Medical Oncology  [address] [place] [address]  Phone: [phone]  Fax: [phone]  Follow Up Time: Routine Yazmin Nino)  Internal Medicine  [address] [place] [address]  Phone: [phone]  Fax: [phone]  Follow Up Time: 1-3 days    Darby Garcia)  HematologyOncology; Internal Medicine; Medical Oncology  [address] [place] [address]  Phone: [phone]  Fax: [phone]  Follow Up Time: Routine    Reno Velázquez)  Neurological Surgery  [address] [place] [address], [address]  [place] [address]  Phone: [phone]  Fax: [phone]  Follow Up Time: 1-3 days

## 2020-03-23 NOTE — PROGRESS NOTE ADULT - SUBJECTIVE AND OBJECTIVE BOX
Pt seen and examined at bedside. No fresh complaints. He refused brain biopsy as he wants to go to a hospital in [facility] for second opinion.     Vital Signs Last 24 Hrs  T(C): 36.4 (23 Mar 2020 13:14), Max: 36.4 (23 Mar 2020 13:14)  T(F): 97.6 (23 Mar 2020 13:14), Max: 97.6 (23 Mar 2020 13:14)  HR: 87 (23 Mar 2020 13:14) (65 - 87)  BP: 125/79 (23 Mar 2020 13:14) (115/59 - 135/83)  BP(mean): --  RR: 20 (23 Mar 2020 13:14) (18 - 20)  SpO2: 96% (22 Mar 2020 19:30) (96% - 96%)    MEDICATIONS  (STANDING):  atorvastatin 40 milliGRAM(s) Oral at bedtime  dexAMETHasone     Tablet 4 milliGRAM(s) Oral every 6 hours  levETIRAcetam 500 milliGRAM(s) Oral two times a day  lisinopril 2.5 milliGRAM(s) Oral daily    MEDICATIONS  (PRN):    Labs:                        14.8   13.22 )-----------( 273      ( 23 Mar 2020 06:45 )             43.6             03-23    135  |  98  |  31<H>  ----------------------------<  135<H>  4.9   |  25  |  1.0    Ca    9.0      23 Mar 2020 06:45    TPro  6.4  /  Alb  4.2  /  TBili  0.4  /  DBili  x   /  AST  14  /  ALT  29  /  AlkPhos  79  03-23    LIVER FUNCTIONS - ( 23 Mar 2020 06:45 )  Alb: 4.2 g/dL / Pro: 6.4 g/dL / ALK PHOS: 79 U/L / ALT: 29 U/L / AST: 14 U/L / GGT: x                 PT/INR - ( 22 Mar 2020 05:53 )   PT: 12.90 sec;   INR: 1.12 ratio         PTT - ( 22 Mar 2020 05:53 )  PTT:28.0 sec

## 2020-03-23 NOTE — DISCHARGE NOTE PROVIDER - NSDCCPCAREPLAN_GEN_ALL_CORE_FT
PRINCIPAL DISCHARGE DIAGNOSIS  Diagnosis: Brain mass  Assessment and Plan of Treatment: We found some lesions on your brain imaging. This might have explained your symptoms. We wished to do a brain biopsy, but understand you want a second opinion. Our reccomendations were for a brain biopsy today, 3/23, but since you wish to not have brain biopsy will discharge you and highly reccomended you follow up your PCP and a second opinion ASAP

## 2020-03-23 NOTE — DISCHARGE NOTE PROVIDER - HOSPITAL COURSE
HPI:69 year old male with PMhx of HTN and HLD presents to the ED for evaluation of confusion. Supplemental history obtained from the wife over the phone as the patient is disoriented. Per family over past 2 months patient endorses intermittent headache and has been more forgetful and confused. patient had MRI as out patient 3/12/20 which showed multiple masses in brain prompting visit to the ED per PMD for further eval.  Pt has no complaints at this time. patient denies headache, blurry vision, motor or sensory deficits, change in bowel or urinary habits, fever, chills, nausea or vomiting.        Hospital Course: Patient had neuroimaging done. CT scan with brain masses and brain edema and mass effect, high suspicion for GBM. Patient started on decadron and keppra. Patient had brain biopsy scheduled for 3/23. Patient and wife are in denial about likely GBM diagnosis, refusing any additional testing at [facility] and want second opinion. Patient to be discharged. At time of discharge, has good mentation with no FND.

## 2020-03-23 NOTE — CHART NOTE - NSCHARTNOTEFT_GEN_A_CORE
<<<RESIDENT DISCHARGE NOTE>>>     MARY GOMEZ  MRN-[id number]    VITAL SIGNS:  T(F): 97.6 (03-23-20 @ 13:14), Max: 97.6 (03-23-20 @ 13:14)  HR: 87 (03-23-20 @ 13:14)  BP: 125/79 (03-23-20 @ 13:14)  SpO2: --      PHYSICAL EXAMINATION:  General:  NAD, obese appearing  Head & Neck: large neck  Pulmonary: CTAL  Cardiovascular: nl s1/s2  Gastrointestinal/Abdomen & Pelvis: nt/nd, no EG tenderness  Neurologic/Motor: AO times 3    TEST RESULTS:                        14.8   13.22 )-----------( 273      ( 23 Mar 2020 06:45 )             43.6       03-23    135  |  98  |  31<H>  ----------------------------<  135<H>  4.9   |  25  |  1.0    Ca    9.0      23 Mar 2020 06:45    TPro  6.4  /  Alb  4.2  /  TBili  0.4  /  DBili  x   /  AST  14  /  ALT  29  /  AlkPhos  79  03-23      FINAL DISCHARGE INTERVIEW:  Resident(s) Present: (Name: Tracy)    DISCHARGE MEDICATION RECONCILIATION  reviewed with Attending (Name: Pasquale)    DISPOSITION:   [X  ] Home,    [  ] Home with Visiting Nursing Services,   [    ]  SNF/ NH,    [   ] Acute Rehab (4A),   [   ] Other (Specify:_________)                       *Patient/wife refusing brain biopsy, want second opinion

## 2020-03-23 NOTE — DISCHARGE NOTE PROVIDER - PROVIDER TOKENS
PROVIDER:[TOKEN:[[id number]],FOLLOWUP:[1-3 days]] PROVIDER:[TOKEN:[[id number]],FOLLOWUP:[1-3 days]],PROVIDER:[TOKEN:[[id number]],FOLLOWUP:[Routine]] PROVIDER:[TOKEN:[[id number]],FOLLOWUP:[1-3 days]],PROVIDER:[TOKEN:[[id number]],FOLLOWUP:[Routine]],PROVIDER:[TOKEN:[[id number]],FOLLOWUP:[1-3 days]]

## 2021-06-22 NOTE — H&P ADULT - NSHPREVIEWOFSYSTEMS_GEN_ALL_CORE
69 year old male with PMhx of HTN and HLD presents to the ED for evaluation of confusion. supplemental history obtained from the wife over the phone as the patient is disoriented. Per family over past 2 months patient endorses intermittent headache and has been more forgetful and confused. patient had MRI as out patient 3/12/20 which showed multiple masses in brain prompting visit to the ED per PMD for further eval.  Pt has no complaints at this time. patient denies headache, blurry vision, motor or sensory deficits, change in bowel or urinary habits, fever, chills, nausea or vomiting.    in the ed patient is hemodynamically stable.  CT scan with brain masses and brain edema and mass effect.  admitted to medicine for further management. tingling left arm

## 2022-04-25 NOTE — ED ADULT NURSE NOTE - NURSING NEURO LEVEL OF CONSCIOUSNESS
Impression: Presbyopia: H52.4. Plan: Discussed diagnosis in detail with patient. New glasses Rx was given today. Recommend UV protection. Potential for initial adaptation discussed. Schedule dilation.
alert and awake/but seems hesitate thinks about it then does it/follows commands

## 2024-05-07 NOTE — PHYSICAL THERAPY INITIAL EVALUATION ADULT - PHYSICAL ASSIST/NONPHYSICAL ASSIST: SIT/STAND, REHAB EVAL
Tylenol, dental balls, amoxicillin and will discharge with prescription for amoxicillin.  He is going to call his dentist first thing in the morning.  Return precautions given.    Problems Addressed:  Dental infection: acute illness or injury    Risk  OTC drugs.  Prescription drug management.            REASSESSMENT          CONSULTS:  None    PROCEDURES:  Unless otherwise noted below, none     Procedures    DISCHARGE NOTE:  7:11 PM  The patient has been re-evaluated and feeling much better and are stable for discharge.  All available radiology and laboratory results have been reviewed with patient and/or available family.  Patient and/or family verbally conveyed their understanding and agreement of the patient's signs, symptoms, diagnosis, treatment and prognosis and additionally agree to follow-up as recommended in the discharge instructions or to return to the Emergency Department should their condition change or worsen prior to their follow-up appointment.  All questions have been answered and patient and/or available family express understanding.      LABORATORY RESULTS:  Labs Reviewed - No data to display    All other labs were within normal range or not returned as of this dictation.    IMAGING RESULTS:  No orders to display        MEDICATIONS GIVEN:  Medications   acetaminophen (TYLENOL) tablet 1,000 mg (has no administration in time range)   dental ball (lidocaine/benadryl/benzocaine) mixture (has no administration in time range)   amoxicillin (AMOXIL) capsule 1,000 mg (has no administration in time range)       IMPRESSION:  1. Dental infection        PLAN:  DISPOSITION Decision To Discharge 05/07/2024 07:06:43 PM      PATIENT REFERRED TO:  Your dentist    In 2 days      [facility] EMERGENCY DEPT  [address]  [facility] [address] [phone]    As needed, If symptoms worsen      DISCHARGE MEDICATIONS:  New Prescriptions    AMOXICILLIN (AMOXIL) 875 MG TABLET    Take 1 tablet by mouth 2 times daily for 10 
1 person assist